# Patient Record
Sex: FEMALE | Race: WHITE | Employment: UNEMPLOYED
[De-identification: names, ages, dates, MRNs, and addresses within clinical notes are randomized per-mention and may not be internally consistent; named-entity substitution may affect disease eponyms.]

---

## 2017-04-19 ENCOUNTER — HOSPITAL ENCOUNTER (OUTPATIENT)
Dept: GENERAL RADIOLOGY | Facility: CLINIC | Age: 47
Discharge: HOME | End: 2017-04-19
Payer: MEDICARE

## 2017-04-19 ENCOUNTER — HOSPITAL ENCOUNTER (OUTPATIENT)
Facility: CLINIC | Age: 47
Discharge: HOME | End: 2017-04-19
Payer: MEDICARE

## 2017-04-19 DIAGNOSIS — Z87.81 HISTORY OF SKULL FRACTURE: ICD-10-CM

## 2017-04-19 DIAGNOSIS — R09.81 NASAL CONGESTION: ICD-10-CM

## 2017-04-19 PROBLEM — F41.9 ANXIETY AND DEPRESSION: Status: ACTIVE | Noted: 2017-04-19

## 2017-04-19 PROBLEM — J30.2 SEASONAL ALLERGIC RHINITIS: Status: ACTIVE | Noted: 2017-04-19

## 2017-04-19 PROBLEM — E11.9 CONTROLLED TYPE 2 DIABETES MELLITUS WITHOUT COMPLICATION, WITHOUT LONG-TERM CURRENT USE OF INSULIN (HCC): Status: ACTIVE | Noted: 2017-04-19

## 2017-04-19 PROBLEM — J45.20 MILD INTERMITTENT ASTHMA WITHOUT COMPLICATION: Status: ACTIVE | Noted: 2017-04-19

## 2017-04-19 PROBLEM — G47.9 SLEEP DISTURBANCE: Status: ACTIVE | Noted: 2017-04-19

## 2017-04-19 PROBLEM — E04.9 GOITER: Status: ACTIVE | Noted: 2017-04-19

## 2017-04-19 PROBLEM — R07.9 CHEST PAIN: Status: ACTIVE | Noted: 2017-04-19

## 2017-04-19 PROBLEM — R00.2 PALPITATIONS: Status: ACTIVE | Noted: 2017-04-19

## 2017-04-19 PROBLEM — R41.3 MEMORY LOSS: Status: ACTIVE | Noted: 2017-04-19

## 2017-04-19 PROBLEM — F32.A ANXIETY AND DEPRESSION: Status: ACTIVE | Noted: 2017-04-19

## 2017-04-19 PROCEDURE — 70260 X-RAY EXAM OF SKULL: CPT

## 2017-04-19 PROCEDURE — 70220 X-RAY EXAM OF SINUSES: CPT

## 2017-04-25 ENCOUNTER — OFFICE VISIT (OUTPATIENT)
Dept: PSYCHOLOGY | Age: 47
End: 2017-04-25
Payer: MEDICARE

## 2017-04-25 DIAGNOSIS — F43.29 STRESS AND ADJUSTMENT REACTION: Primary | ICD-10-CM

## 2017-04-25 PROCEDURE — 90791 PSYCH DIAGNOSTIC EVALUATION: CPT | Performed by: SOCIAL WORKER

## 2017-04-25 ASSESSMENT — PATIENT HEALTH QUESTIONNAIRE - PHQ9
1. LITTLE INTEREST OR PLEASURE IN DOING THINGS: 1
SUM OF ALL RESPONSES TO PHQ QUESTIONS 1-9: 1
SUM OF ALL RESPONSES TO PHQ9 QUESTIONS 1 & 2: 1
2. FEELING DOWN, DEPRESSED OR HOPELESS: 0

## 2017-05-02 ENCOUNTER — OFFICE VISIT (OUTPATIENT)
Dept: PSYCHOLOGY | Age: 47
End: 2017-05-02
Payer: MEDICARE

## 2017-05-02 DIAGNOSIS — F43.29 STRESS AND ADJUSTMENT REACTION: Primary | ICD-10-CM

## 2017-05-02 PROCEDURE — 90832 PSYTX W PT 30 MINUTES: CPT | Performed by: SOCIAL WORKER

## 2017-12-15 ENCOUNTER — HOSPITAL ENCOUNTER (EMERGENCY)
Age: 47
Discharge: HOME | End: 2017-12-15
Attending: EMERGENCY MEDICINE
Payer: MEDICARE

## 2017-12-15 ENCOUNTER — APPOINTMENT (OUTPATIENT)
Dept: GENERAL RADIOLOGY | Age: 47
End: 2017-12-15
Payer: MEDICARE

## 2017-12-15 VITALS
HEIGHT: 68 IN | HEART RATE: 78 BPM | DIASTOLIC BLOOD PRESSURE: 84 MMHG | WEIGHT: 275 LBS | RESPIRATION RATE: 12 BRPM | BODY MASS INDEX: 41.68 KG/M2 | TEMPERATURE: 98.3 F | SYSTOLIC BLOOD PRESSURE: 153 MMHG | OXYGEN SATURATION: 97 %

## 2017-12-15 DIAGNOSIS — M25.562 ACUTE PAIN OF LEFT KNEE: ICD-10-CM

## 2017-12-15 DIAGNOSIS — M79.672 PAIN IN BOTH FEET: ICD-10-CM

## 2017-12-15 DIAGNOSIS — M79.671 PAIN IN BOTH FEET: ICD-10-CM

## 2017-12-15 DIAGNOSIS — M25.572 ACUTE BILATERAL ANKLE PAIN: ICD-10-CM

## 2017-12-15 DIAGNOSIS — R07.81 PAINFUL RIB: ICD-10-CM
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
## 2017-12-15 DIAGNOSIS — L02.91 ABSCESS: ICD-10-CM

## 2017-12-15 DIAGNOSIS — M25.571 ACUTE BILATERAL ANKLE PAIN: ICD-10-CM

## 2017-12-15 PROCEDURE — 73610 X-RAY EXAM OF ANKLE: CPT

## 2017-12-15 PROCEDURE — 99284 EMERGENCY DEPT VISIT MOD MDM: CPT

## 2017-12-15 PROCEDURE — 71111 X-RAY EXAM RIBS/CHEST4/> VWS: CPT

## 2017-12-15 PROCEDURE — 73562 X-RAY EXAM OF KNEE 3: CPT

## 2017-12-15 PROCEDURE — 73630 X-RAY EXAM OF FOOT: CPT

## 2017-12-15 PROCEDURE — 71020 XR CHEST STANDARD TWO VW: CPT

## 2017-12-15 RX ORDER — TRAMADOL HYDROCHLORIDE 50 MG/1
50 TABLET ORAL EVERY 6 HOURS PRN
Qty: 10 TABLET | Refills: 0 | Status: SHIPPED | OUTPATIENT
Start: 2017-12-15 | End: 2018-01-18 | Stop reason: ALTCHOICE

## 2017-12-15 RX ORDER — CLINDAMYCIN HYDROCHLORIDE 150 MG/1
150 CAPSULE ORAL 4 TIMES DAILY
Qty: 28 CAPSULE | Refills: 0 | Status: SHIPPED | OUTPATIENT
Start: 2017-12-15 | End: 2017-12-22

## 2017-12-15 ASSESSMENT — PAIN DESCRIPTION - PAIN TYPE: TYPE: ACUTE PAIN

## 2017-12-15 ASSESSMENT — PAIN SCALES - GENERAL: PAINLEVEL_OUTOF10: 7

## 2017-12-16 NOTE — ED PROVIDER NOTES
[address]  eMERGENCY dEPARTMENT eNCOUnter      Pt Name: Nubia Burnett  MRN: [id number]  [place] 1970  Date of evaluation: 12/15/2017  Provider: Faustina Nice PA-C    CHIEF COMPLAINT       Chief Complaint   Patient presents with    Rib Injury    Toe Pain           HISTORY OF PRESENT ILLNESS  (Location/Symptom, Timing/Onset, Context/Setting, Quality, Duration, Modifying Factors, Severity.)   Nubia Burnett is a 49 y.o. female who presents to the emergency department with complaints of left leg, right foot, and rib pain after falling. Pt states she slipped in her socks and fell down 5-7 stairs 2 days ago. Pt states she fell on all fours. Pt is complaining of left foot, ankle, and knee pain. She also states her right foot and ankle hurts as well as her posterior ribs. Pt denies pain with inspiration. Denies hitting head or loss of consciousness. Denies neck pain, cp, sob, nausea, vomiting, abd pain. Pt is ambulatory. No other complaints. Nursing Notes were reviewed. REVIEW OF SYSTEMS    (2-9 systems for level 4, 10 or more for level 5)     Review of Systems   Musculoskeletal: left leg pain, right foot pain, rib pain, fall     Except as noted above the remainder of the review of systems was reviewed and negative.        PAST MEDICAL HISTORY     Past Medical History:   Diagnosis Date    Asthma     Goiter     Morbid obesity ([address]      None otherwise stated in nurses notes    SURGICAL HISTORY       Past Surgical History:   Procedure Laterality Date    CAUTERIZE INNER NOSE  08/31/2016    FRACTURE SURGERY      small toe on right foot  and left baby finger    SINUS SURGERY  08/31/2016    TONSILLECTOMY       None otherwise stated in nurses notes    [address]       Discharge Medication List as of 12/15/2017  4:46 PM      CONTINUE these medications which have NOT CHANGED    Details   glucose monitoring kit (FREESTYLE) monitoring kit Disp-1 kit, R-0, Benitez BS's Xr Foot Right (min 3 Views)    Result Date: 12/15/2017  EXAMINATION: 3 VIEWS OF THE RIGHT FOOT; 3 VIEWS OF THE RIGHT ANKLE 12/15/2017 3:14 pm COMPARISON: None. HISTORY: ORDERING SYSTEM PROVIDED HISTORY: pain TECHNOLOGIST PROVIDED HISTORY: Reason for exam:->pain Ordering Physician Provided Reason for Exam: PT CO pain in her ribs, both ankles and feet, and her left knee after falling down a flight of stairs 2 days ago. Acuity: Acute Type of Exam: Initial 39-year-old female who complains of acute rib pain, bilateral ankle and foot pain and left knee pain after falling down a flight of stairs 2 days ago FINDINGS: Right ankle: Mild soft tissue swelling overlying the lateral malleolus. Ankle mortise appears intact. Osseous alignment is normal.  Joint spaces are well maintained. No acute fracture or gross dislocation. No marginal erosions. No tibiotalar joint effusion. Boehler's angle is maintained. Moderate plantar calcaneal spur. Mild enthesopathy at the distal Achilles tendon insertion. Right foot: Moderate plantar calcaneal spur. Boehler's angle is maintained. No tibiotalar joint effusion. Mild soft tissue swelling overlying the lateral aspect of the ankle. Osseous alignment is normal.  Joint space is relatively well maintained. No marginal erosions. No acute fracture or gross dislocation. Cortical thickening of the diaphysis of the 4th metatarsal may represent sequela of a age-indeterminate stress fracture or stress reaction. Right ankle: 1. Mild soft tissue swelling overlying the lateral malleolus. 2. Moderate plantar calcaneal spur. 3. No acute fracture or gross dislocation. Right foot: 1. No acute fracture or gross dislocation. 2. Moderate plantar calcaneal spur. 3. Mild soft tissue swelling overlying the lateral aspect of the ankle. 4. Cortical thickening of the 4th metatarsal diaphysis may represent sequela of age-indeterminate stress fracture/stress reaction.   Please correlate for any pain in the region of the 4th metatarsal diaphysis. LABS:  Labs Reviewed - No data to display    All other labs were within normal range or not returned as of this dictation. EMERGENCY DEPARTMENT COURSE and DIFFERENTIAL DIAGNOSIS/MDM:   Vitals:    Vitals:    12/15/17 1434   BP: (!) 153/84   Pulse: 78   Resp: 12   Temp: 98.3 °F (36.8 °C)   TempSrc: Oral   SpO2: 97%   Weight: 275 lb (124.7 kg)   Height: 5' 8\" (1.727 m)       Pt presents with pain after falling down stairs two days ago. Left ankle, foot, knee. Right ankle and foot. Rib pain. Did not hit head. No neuro deficits. Xrays show possible stress fracture over 4th metatarsal.  Did re-examine patient and she is not tender in this area. Does not want splint or crutches. Pt is ok for discharge home. Follow up with PCP. Patient instructed to return to the emergency room if symptoms worsen, return, or any other concern right away which is agreed by the patient    ED MEDS:  Orders Placed This Encounter   Medications    traMADol (ULTRAM) 50 MG tablet     Sig: Take 1 tablet by mouth every 6 hours as needed for Pain . Dispense:  10 tablet     Refill:  0    clindamycin (CLEOCIN) 150 MG capsule     Sig: Take 1 capsule by mouth 4 times daily for 7 days     Dispense:  28 capsule     Refill:  0         CONSULTS:  None    PROCEDURES:  None      FINAL IMPRESSION      1. Fall (on) (from) other stairs and steps, initial encounter    2. Pain in both feet    3. Acute bilateral ankle pain    4. Painful rib    5. Acute pain of left knee    6.  Abscess          DISPOSITION/PLAN   DISPOSITION Decision To Discharge    PATIENT REFERRED TO:  YESSY Calvin [address] [facility],[address]  [address] [facility] [address]  [phone]    Call in 1 day      [facility] ED  [place] [address]  [phone]    If symptoms worsen    M[facility] Goyal [address] [place] [address]  [phone]    Call in 1 day        DISCHARGE MEDICATIONS:  Discharge Medication List as of 12/15/2017  4:46 PM      START taking these medications    Details   traMADol (ULTRAM) 50 MG tablet Take 1 tablet by mouth every 6 hours as needed for Pain ., Disp-10 tablet, R-0Print      clindamycin (CLEOCIN) 150 MG capsule Take 1 capsule by mouth 4 times daily for 7 days, Disp-28 capsule, R-0Print                   (Please note that portions of this note were completed with a voice recognition program.  Efforts were made to edit the dictations but occasionally words are mis-transcribed.)    Srikanth Neives, [address] Jona Alvarez PA-C  12/16/17 9325

## 2018-01-10 ENCOUNTER — HOSPITAL ENCOUNTER (OUTPATIENT)
Age: 48
Setting detail: SPECIMEN
Discharge: HOME | End: 2018-01-10
Payer: MEDICARE

## 2018-01-10 DIAGNOSIS — Z11.4 SCREENING FOR HIV (HUMAN IMMUNODEFICIENCY VIRUS): ICD-10-CM

## 2018-01-10 DIAGNOSIS — R41.3 MEMORY LOSS: ICD-10-CM

## 2018-01-10 DIAGNOSIS — E04.9 GOITER: ICD-10-CM

## 2018-01-10 DIAGNOSIS — E11.9 CONTROLLED TYPE 2 DIABETES MELLITUS WITHOUT COMPLICATION, WITHOUT LONG-TERM CURRENT USE OF INSULIN (HCC): ICD-10-CM

## 2018-01-10 DIAGNOSIS — I10 ESSENTIAL HYPERTENSION: ICD-10-CM

## 2018-01-10 DIAGNOSIS — E01.0 THYROMEGALY: ICD-10-CM

## 2018-01-10 DIAGNOSIS — R00.2 PALPITATIONS: ICD-10-CM

## 2018-01-10 DIAGNOSIS — R07.9 CHEST PAIN, UNSPECIFIED TYPE: ICD-10-CM

## 2018-01-10 DIAGNOSIS — Z00.00 PREVENTATIVE HEALTH CARE: ICD-10-CM

## 2018-01-10 PROBLEM — E55.9 VITAMIN D DEFICIENCY: Status: ACTIVE | Noted: 2018-01-10

## 2018-01-10 PROBLEM — R03.0 ELEVATED BLOOD PRESSURE READING: Status: ACTIVE | Noted: 2018-01-10

## 2018-01-10 LAB
CREATININE URINE: 234.5 MG/DL (ref 28–217)
HCT VFR BLD CALC: 44.7 % (ref 36.3–47.1)
HEMOGLOBIN: 14.5 G/DL (ref 11.9–15.1)
HIV AG/AB: NONREACTIVE
MCH RBC QN AUTO: 28.3 PG (ref 25.2–33.5)
MCHC RBC AUTO-ENTMCNC: 32.4 G/DL (ref 28.4–34.8)
MCV RBC AUTO: 87.3 FL (ref 82.6–102.9)
MICROALBUMIN/CREAT 24H UR: 706 MG/L
MICROALBUMIN/CREAT UR-RTO: 301 MCG/MG CREAT
PDW BLD-RTO: 12.9 % (ref 11.8–14.4)
PLATELET # BLD: 329 K/UL (ref 138–453)
PMV BLD AUTO: 10.7 FL (ref 8.1–13.5)
RBC # BLD: 5.12 M/UL (ref 3.95–5.11)
TSH SERPL DL<=0.05 MIU/L-ACNC: 0.87 MIU/L (ref 0.3–5)
VITAMIN B-12: 425 PG/ML (ref 232–1245)
VITAMIN D 25-HYDROXY: 10.1 NG/ML (ref 30–100)
WBC # BLD: 10.5 K/UL (ref 3.5–11.3)

## 2018-01-11 PROBLEM — R80.9 MICROALBUMINURIA DUE TO TYPE 2 DIABETES MELLITUS (HCC): Status: ACTIVE | Noted: 2018-01-11

## 2018-01-11 PROBLEM — E11.29 MICROALBUMINURIA DUE TO TYPE 2 DIABETES MELLITUS (HCC): Status: ACTIVE | Noted: 2018-01-11

## 2018-01-12 LAB
ALBUMIN SERPL-MCNC: 4.2 G/DL (ref 3.5–5.2)
ALBUMIN/GLOBULIN RATIO: 1.4 (ref 1–2.5)
ALP BLD-CCNC: 81 U/L (ref 35–104)
ALT SERPL-CCNC: 21 U/L (ref 5–33)
ANION GAP SERPL CALCULATED.3IONS-SCNC: 18 MMOL/L (ref 9–17)
AST SERPL-CCNC: 16 U/L
BILIRUB SERPL-MCNC: 0.22 MG/DL (ref 0.3–1.2)
BUN BLDV-MCNC: 6 MG/DL (ref 6–20)
BUN/CREAT BLD: ABNORMAL (ref 9–20)
CALCIUM SERPL-MCNC: 8.8 MG/DL (ref 8.6–10.4)
CHLORIDE BLD-SCNC: 99 MMOL/L (ref 98–107)
CO2: 19 MMOL/L (ref 20–31)
CREAT SERPL-MCNC: 0.51 MG/DL (ref 0.5–0.9)
CULTURE: ABNORMAL
CULTURE: ABNORMAL
GFR AFRICAN AMERICAN: >60 ML/MIN
GFR NON-AFRICAN AMERICAN: >60 ML/MIN
GFR SERPL CREATININE-BSD FRML MDRD: ABNORMAL ML/MIN/{1.73_M2}
GFR SERPL CREATININE-BSD FRML MDRD: ABNORMAL ML/MIN/{1.73_M2}
GLUCOSE BLD-MCNC: 150 MG/DL (ref 70–99)
Lab: ABNORMAL
ORGANISM: ABNORMAL
POTASSIUM SERPL-SCNC: 4.5 MMOL/L (ref 3.7–5.3)
SODIUM BLD-SCNC: 136 MMOL/L (ref 135–144)
SPECIMEN DESCRIPTION: ABNORMAL
STATUS: ABNORMAL
TOTAL PROTEIN: 7.1 G/DL (ref 6.4–8.3)

## 2018-01-17 ENCOUNTER — HOSPITAL ENCOUNTER (OUTPATIENT)
Age: 48
Discharge: HOME | End: 2018-01-17
Payer: MEDICARE

## 2018-01-17 DIAGNOSIS — R07.9 CHEST PAIN, UNSPECIFIED TYPE: ICD-10-CM

## 2018-01-17 DIAGNOSIS — Z00.00 PREVENTATIVE HEALTH CARE: ICD-10-CM

## 2018-01-17 DIAGNOSIS — R00.2 PALPITATIONS: ICD-10-CM

## 2018-01-17 DIAGNOSIS — I10 ESSENTIAL HYPERTENSION: ICD-10-CM

## 2018-01-17 DIAGNOSIS — E11.9 CONTROLLED TYPE 2 DIABETES MELLITUS WITHOUT COMPLICATION, WITHOUT LONG-TERM CURRENT USE OF INSULIN (HCC): ICD-10-CM

## 2018-01-17 DIAGNOSIS — E78.2 HYPERLIPIDEMIA, MIXED: ICD-10-CM

## 2018-01-17 LAB
ALBUMIN SERPL-MCNC: 4.1 G/DL (ref 3.5–5.2)
ALBUMIN/GLOBULIN RATIO: ABNORMAL (ref 1–2.5)
ALP BLD-CCNC: 82 U/L (ref 35–104)
ALT SERPL-CCNC: 25 U/L (ref 5–33)
ANION GAP SERPL CALCULATED.3IONS-SCNC: 15 MMOL/L (ref 9–17)
AST SERPL-CCNC: 18 U/L
BILIRUB SERPL-MCNC: 0.42 MG/DL (ref 0.3–1.2)
BUN BLDV-MCNC: 13 MG/DL (ref 6–20)
BUN/CREAT BLD: ABNORMAL (ref 9–20)
CALCIUM SERPL-MCNC: 9.2 MG/DL (ref 8.6–10.4)
CHLORIDE BLD-SCNC: 101 MMOL/L (ref 98–107)
CHOLESTEROL/HDL RATIO: 5.1
CHOLESTEROL: 192 MG/DL
CO2: 23 MMOL/L (ref 20–31)
CREAT SERPL-MCNC: 0.58 MG/DL (ref 0.5–0.9)
GFR AFRICAN AMERICAN: >60 ML/MIN
GFR NON-AFRICAN AMERICAN: >60 ML/MIN
GFR SERPL CREATININE-BSD FRML MDRD: ABNORMAL ML/MIN/{1.73_M2}
GFR SERPL CREATININE-BSD FRML MDRD: ABNORMAL ML/MIN/{1.73_M2}
GLUCOSE BLD-MCNC: 149 MG/DL (ref 70–99)
HDLC SERPL-MCNC: 38 MG/DL
LDL CHOLESTEROL: 120 MG/DL (ref 0–130)
POTASSIUM SERPL-SCNC: 4.4 MMOL/L (ref 3.7–5.3)
SODIUM BLD-SCNC: 139 MMOL/L (ref 135–144)
TOTAL PROTEIN: 7.4 G/DL (ref 6.4–8.3)
TRIGL SERPL-MCNC: 169 MG/DL
VLDLC SERPL CALC-MCNC: ABNORMAL MG/DL (ref 1–30)

## 2018-01-17 PROCEDURE — 80061 LIPID PANEL: CPT

## 2018-01-17 PROCEDURE — 36415 COLL VENOUS BLD VENIPUNCTURE: CPT

## 2018-01-17 PROCEDURE — 80053 COMPREHEN METABOLIC PANEL: CPT

## 2018-01-18 ENCOUNTER — HOSPITAL ENCOUNTER (OUTPATIENT)
Age: 48
Setting detail: SPECIMEN
Discharge: HOME | End: 2018-01-18
Payer: MEDICARE

## 2018-01-18 DIAGNOSIS — R30.0 DYSURIA: ICD-10-CM

## 2018-01-18 DIAGNOSIS — Z87.440 HISTORY OF UTI: ICD-10-CM

## 2018-01-18 PROBLEM — M79.671 CHRONIC PAIN OF RIGHT HEEL: Status: ACTIVE | Noted: 2018-01-18

## 2018-01-18 PROBLEM — R03.0 ELEVATED BLOOD PRESSURE READING: Status: RESOLVED | Noted: 2018-01-10 | Resolved: 2018-01-18

## 2018-01-18 PROBLEM — G89.29 CHRONIC PAIN OF RIGHT HEEL: Status: ACTIVE | Noted: 2018-01-18

## 2018-01-19 LAB
CULTURE: NORMAL
CULTURE: NORMAL
Lab: NORMAL
SPECIMEN DESCRIPTION: NORMAL
STATUS: NORMAL

## 2018-01-24 ENCOUNTER — HOSPITAL ENCOUNTER (OUTPATIENT)
Dept: GENERAL RADIOLOGY | Facility: CLINIC | Age: 48
Discharge: HOME | End: 2018-01-24
Payer: MEDICARE

## 2018-01-24 ENCOUNTER — HOSPITAL ENCOUNTER (OUTPATIENT)
Facility: CLINIC | Age: 48
Discharge: HOME | End: 2018-01-24
Payer: MEDICARE

## 2018-01-24 ENCOUNTER — HOSPITAL ENCOUNTER (OUTPATIENT)
Age: 48
Setting detail: SPECIMEN
Discharge: HOME | End: 2018-01-24
Payer: MEDICARE

## 2018-01-24 DIAGNOSIS — N30.01 ACUTE CYSTITIS WITH HEMATURIA: ICD-10-CM

## 2018-01-24 PROCEDURE — 74018 RADEX ABDOMEN 1 VIEW: CPT

## 2018-01-26 LAB
CULTURE: ABNORMAL
CULTURE: ABNORMAL
Lab: ABNORMAL
ORGANISM: ABNORMAL
SPECIMEN DESCRIPTION: ABNORMAL
STATUS: ABNORMAL

## 2018-02-01 ENCOUNTER — HOSPITAL ENCOUNTER (OUTPATIENT)
Age: 48
Setting detail: SPECIMEN
Discharge: HOME | End: 2018-02-01
Payer: MEDICARE

## 2018-02-01 DIAGNOSIS — Z87.440 HISTORY OF UTI: ICD-10-CM

## 2018-02-02 LAB
CULTURE: NORMAL
CULTURE: NORMAL
Lab: NORMAL
SPECIMEN DESCRIPTION: NORMAL
STATUS: NORMAL

## 2018-04-17 ENCOUNTER — HOSPITAL ENCOUNTER (OUTPATIENT)
Age: 48
Setting detail: SPECIMEN
Discharge: HOME | End: 2018-04-17
Payer: MEDICARE

## 2018-04-17 DIAGNOSIS — E55.9 VITAMIN D DEFICIENCY: ICD-10-CM

## 2018-04-17 LAB — VITAMIN D 25-HYDROXY: 13.8 NG/ML (ref 30–100)

## 2018-06-22 ENCOUNTER — HOSPITAL ENCOUNTER (OUTPATIENT)
Dept: ULTRASOUND IMAGING | Age: 48
Discharge: HOME | End: 2018-06-24
Payer: MEDICARE

## 2018-06-22 ENCOUNTER — OFFICE VISIT (OUTPATIENT)
Dept: FAMILY MEDICINE CLINIC | Age: 48
End: 2018-06-22
Payer: MEDICARE

## 2018-06-22 ENCOUNTER — HOSPITAL ENCOUNTER (OUTPATIENT)
Age: 48
Discharge: HOME | End: 2018-06-24
Payer: MEDICARE

## 2018-06-22 ENCOUNTER — HOSPITAL ENCOUNTER (OUTPATIENT)
Dept: GENERAL RADIOLOGY | Age: 48
Discharge: HOME | End: 2018-06-24
Payer: MEDICARE

## 2018-06-22 ENCOUNTER — HOSPITAL ENCOUNTER (OUTPATIENT)
Dept: CT IMAGING | Age: 48
Discharge: HOME | End: 2018-06-24
Payer: MEDICARE

## 2018-06-22 ENCOUNTER — HOSPITAL ENCOUNTER (OUTPATIENT)
Age: 48
Discharge: HOME | End: 2018-06-22
Payer: MEDICARE

## 2018-06-22 VITALS
SYSTOLIC BLOOD PRESSURE: 110 MMHG | HEART RATE: 80 BPM | TEMPERATURE: 97.9 F | RESPIRATION RATE: 18 BRPM | WEIGHT: 246 LBS | DIASTOLIC BLOOD PRESSURE: 70 MMHG | BODY MASS INDEX: 37.41 KG/M2

## 2018-06-22 DIAGNOSIS — E11.29 MICROALBUMINURIA DUE TO TYPE 2 DIABETES MELLITUS (HCC): ICD-10-CM

## 2018-06-22 DIAGNOSIS — E78.2 HYPERLIPIDEMIA, MIXED: ICD-10-CM

## 2018-06-22 DIAGNOSIS — M54.9 MID BACK PAIN: ICD-10-CM

## 2018-06-22 DIAGNOSIS — R20.2 NUMBNESS AND TINGLING OF LEFT LEG: ICD-10-CM

## 2018-06-22 DIAGNOSIS — R80.9 MICROALBUMINURIA DUE TO TYPE 2 DIABETES MELLITUS (HCC): ICD-10-CM

## 2018-06-22 DIAGNOSIS — G89.29 CHRONIC BILATERAL LOW BACK PAIN WITHOUT SCIATICA: ICD-10-CM

## 2018-06-22 DIAGNOSIS — M54.50 CHRONIC BILATERAL LOW BACK PAIN WITHOUT SCIATICA: ICD-10-CM

## 2018-06-22 DIAGNOSIS — R20.0 NUMBNESS AND TINGLING OF LEFT LEG: ICD-10-CM

## 2018-06-22 DIAGNOSIS — R41.3 SHORT-TERM MEMORY LOSS: ICD-10-CM

## 2018-06-22 DIAGNOSIS — E11.9 CONTROLLED TYPE 2 DIABETES MELLITUS WITHOUT COMPLICATION, WITHOUT LONG-TERM CURRENT USE OF INSULIN (HCC): ICD-10-CM

## 2018-06-22 DIAGNOSIS — R20.0 NUMBNESS AND TINGLING OF LEFT LEG: Primary | ICD-10-CM

## 2018-06-22 DIAGNOSIS — J01.30 ACUTE SPHENOIDAL SINUSITIS, RECURRENCE NOT SPECIFIED: Primary | ICD-10-CM

## 2018-06-22 DIAGNOSIS — E55.9 VITAMIN D DEFICIENCY: ICD-10-CM

## 2018-06-22 DIAGNOSIS — R20.2 NUMBNESS AND TINGLING OF LEFT LEG: Primary | ICD-10-CM

## 2018-06-22 DIAGNOSIS — I10 ESSENTIAL HYPERTENSION: ICD-10-CM

## 2018-06-22 PROBLEM — M51.36 LUMBAR DEGENERATIVE DISC DISEASE: Status: ACTIVE | Noted: 2018-06-22

## 2018-06-22 PROBLEM — M51.369 LUMBAR DEGENERATIVE DISC DISEASE: Status: ACTIVE | Noted: 2018-06-22

## 2018-06-22 LAB
TSH SERPL DL<=0.05 MIU/L-ACNC: 0.77 MIU/L (ref 0.3–5)
VITAMIN B-12: 386 PG/ML (ref 232–1245)
VITAMIN D 25-HYDROXY: 14.3 NG/ML (ref 30–100)

## 2018-06-22 PROCEDURE — 1036F TOBACCO NON-USER: CPT | Performed by: NURSE PRACTITIONER

## 2018-06-22 PROCEDURE — 70450 CT HEAD/BRAIN W/O DYE: CPT

## 2018-06-22 PROCEDURE — G8427 DOCREV CUR MEDS BY ELIG CLIN: HCPCS | Performed by: NURSE PRACTITIONER

## 2018-06-22 PROCEDURE — 82607 VITAMIN B-12: CPT

## 2018-06-22 PROCEDURE — 93971 EXTREMITY STUDY: CPT

## 2018-06-22 PROCEDURE — 72070 X-RAY EXAM THORAC SPINE 2VWS: CPT

## 2018-06-22 PROCEDURE — 99214 OFFICE O/P EST MOD 30 MIN: CPT | Performed by: NURSE PRACTITIONER

## 2018-06-22 PROCEDURE — 84443 ASSAY THYROID STIM HORMONE: CPT

## 2018-06-22 PROCEDURE — G8417 CALC BMI ABV UP PARAM F/U: HCPCS | Performed by: NURSE PRACTITIONER

## 2018-06-22 PROCEDURE — 36415 COLL VENOUS BLD VENIPUNCTURE: CPT

## 2018-06-22 PROCEDURE — 72100 X-RAY EXAM L-S SPINE 2/3 VWS: CPT

## 2018-06-22 PROCEDURE — 82306 VITAMIN D 25 HYDROXY: CPT

## 2018-06-22 RX ORDER — LISINOPRIL 2.5 MG/1
2.5 TABLET ORAL DAILY
Qty: 30 TABLET | Refills: 3 | Status: SHIPPED | OUTPATIENT
Start: 2018-06-22 | End: 2018-09-20 | Stop reason: SDUPTHER

## 2018-06-22 RX ORDER — AMOXICILLIN AND CLAVULANATE POTASSIUM 875; 125 MG/1; MG/1
1 TABLET, FILM COATED ORAL 2 TIMES DAILY WITH MEALS
Qty: 14 TABLET | Refills: 0 | Status: SHIPPED | OUTPATIENT
Start: 2018-06-22 | End: 2019-01-25 | Stop reason: SDUPTHER

## 2018-06-22 RX ORDER — ATORVASTATIN CALCIUM 10 MG/1
10 TABLET, FILM COATED ORAL NIGHTLY
Qty: 30 TABLET | Refills: 3 | Status: SHIPPED | OUTPATIENT
Start: 2018-06-22 | End: 2018-09-20 | Stop reason: SDUPTHER

## 2018-06-22 RX ORDER — PREDNISONE 10 MG/1
TABLET ORAL
Qty: 18 TABLET | Refills: 0 | Status: SHIPPED | OUTPATIENT
Start: 2018-06-22 | End: 2018-07-01

## 2018-06-22 RX ORDER — METFORMIN HYDROCHLORIDE 750 MG/1
750 TABLET, EXTENDED RELEASE ORAL 2 TIMES DAILY
Qty: 60 TABLET | Refills: 3 | Status: SHIPPED | OUTPATIENT
Start: 2018-06-22 | End: 2018-09-20 | Stop reason: SDUPTHER

## 2018-06-22 RX ORDER — ISOPROPYL ALCOHOL 0.75 G/1
SWAB TOPICAL
Qty: 100 EACH | Refills: 3 | Status: SHIPPED | OUTPATIENT
Start: 2018-06-22 | End: 2018-11-27 | Stop reason: SDUPTHER

## 2018-06-22 ASSESSMENT — PATIENT HEALTH QUESTIONNAIRE - PHQ9
1. LITTLE INTEREST OR PLEASURE IN DOING THINGS: 0
SUM OF ALL RESPONSES TO PHQ9 QUESTIONS 1 & 2: 0
SUM OF ALL RESPONSES TO PHQ QUESTIONS 1-9: 0
2. FEELING DOWN, DEPRESSED OR HOPELESS: 0

## 2018-06-22 ASSESSMENT — ENCOUNTER SYMPTOMS: BACK PAIN: 1

## 2018-07-03 DIAGNOSIS — J45.20 MILD INTERMITTENT ASTHMA WITHOUT COMPLICATION: ICD-10-CM

## 2018-07-03 RX ORDER — NEBULIZER ACCESSORIES
EACH MISCELLANEOUS
Qty: 2 EACH | Refills: 5 | Status: SHIPPED | OUTPATIENT
Start: 2018-07-03 | End: 2019-02-28 | Stop reason: SDUPTHER

## 2018-07-20 ENCOUNTER — HOSPITAL ENCOUNTER (OUTPATIENT)
Age: 48
Setting detail: SPECIMEN
Discharge: HOME | End: 2018-07-20
Payer: MEDICARE

## 2018-07-20 ENCOUNTER — HOSPITAL ENCOUNTER (OUTPATIENT)
Facility: CLINIC | Age: 48
Discharge: HOME | End: 2018-07-22
Payer: MEDICARE

## 2018-07-20 ENCOUNTER — HOSPITAL ENCOUNTER (OUTPATIENT)
Dept: GENERAL RADIOLOGY | Facility: CLINIC | Age: 48
Discharge: HOME | End: 2018-07-22
Payer: MEDICARE

## 2018-07-20 ENCOUNTER — OFFICE VISIT (OUTPATIENT)
Dept: FAMILY MEDICINE CLINIC | Age: 48
End: 2018-07-20
Payer: MEDICARE

## 2018-07-20 VITALS
HEART RATE: 64 BPM | TEMPERATURE: 98 F | RESPIRATION RATE: 16 BRPM | SYSTOLIC BLOOD PRESSURE: 120 MMHG | DIASTOLIC BLOOD PRESSURE: 70 MMHG | BODY MASS INDEX: 36.65 KG/M2 | OXYGEN SATURATION: 98 % | WEIGHT: 241 LBS

## 2018-07-20 DIAGNOSIS — Z00.00 PREVENTATIVE HEALTH CARE: ICD-10-CM

## 2018-07-20 DIAGNOSIS — R43.8 HYPOSMIA: ICD-10-CM

## 2018-07-20 DIAGNOSIS — R80.9 CONTROLLED TYPE 2 DIABETES MELLITUS WITH MICROALBUMINURIA, WITHOUT LONG-TERM CURRENT USE OF INSULIN (HCC): Primary | ICD-10-CM

## 2018-07-20 DIAGNOSIS — R80.9 CONTROLLED TYPE 2 DIABETES MELLITUS WITH MICROALBUMINURIA, WITHOUT LONG-TERM CURRENT USE OF INSULIN (HCC): ICD-10-CM

## 2018-07-20 DIAGNOSIS — R20.2 NUMBNESS AND TINGLING OF LEFT LEG: ICD-10-CM

## 2018-07-20 DIAGNOSIS — J32.9 CHRONIC SINUSITIS, UNSPECIFIED LOCATION: ICD-10-CM

## 2018-07-20 DIAGNOSIS — R20.0 NUMBNESS AND TINGLING OF LEFT LEG: ICD-10-CM

## 2018-07-20 DIAGNOSIS — Z12.39 SCREENING FOR BREAST CANCER: ICD-10-CM

## 2018-07-20 DIAGNOSIS — E11.29 CONTROLLED TYPE 2 DIABETES MELLITUS WITH MICROALBUMINURIA, WITHOUT LONG-TERM CURRENT USE OF INSULIN (HCC): ICD-10-CM

## 2018-07-20 DIAGNOSIS — E55.9 VITAMIN D DEFICIENCY: ICD-10-CM

## 2018-07-20 DIAGNOSIS — I10 ESSENTIAL HYPERTENSION: ICD-10-CM

## 2018-07-20 DIAGNOSIS — E01.0 THYROMEGALY: ICD-10-CM

## 2018-07-20 DIAGNOSIS — J45.20 MILD INTERMITTENT ASTHMA WITHOUT COMPLICATION: ICD-10-CM

## 2018-07-20 DIAGNOSIS — E11.29 CONTROLLED TYPE 2 DIABETES MELLITUS WITH MICROALBUMINURIA, WITHOUT LONG-TERM CURRENT USE OF INSULIN (HCC): Primary | ICD-10-CM

## 2018-07-20 DIAGNOSIS — E78.2 HYPERLIPIDEMIA, MIXED: ICD-10-CM

## 2018-07-20 LAB
CREATININE URINE: 281.5 MG/DL (ref 28–217)
HBA1C MFR BLD: 6.3 %
MICROALBUMIN/CREAT 24H UR: 14 MG/L
MICROALBUMIN/CREAT UR-RTO: 5 MCG/MG CREAT

## 2018-07-20 PROCEDURE — 1036F TOBACCO NON-USER: CPT | Performed by: NURSE PRACTITIONER

## 2018-07-20 PROCEDURE — 2022F DILAT RTA XM EVC RTNOPTHY: CPT | Performed by: NURSE PRACTITIONER

## 2018-07-20 PROCEDURE — 3044F HG A1C LEVEL LT 7.0%: CPT | Performed by: NURSE PRACTITIONER

## 2018-07-20 PROCEDURE — G8417 CALC BMI ABV UP PARAM F/U: HCPCS | Performed by: NURSE PRACTITIONER

## 2018-07-20 PROCEDURE — 73590 X-RAY EXAM OF LOWER LEG: CPT

## 2018-07-20 PROCEDURE — 83036 HEMOGLOBIN GLYCOSYLATED A1C: CPT | Performed by: NURSE PRACTITIONER

## 2018-07-20 PROCEDURE — 99214 OFFICE O/P EST MOD 30 MIN: CPT | Performed by: NURSE PRACTITIONER

## 2018-07-20 PROCEDURE — G8427 DOCREV CUR MEDS BY ELIG CLIN: HCPCS | Performed by: NURSE PRACTITIONER

## 2018-07-20 RX ORDER — AMOXICILLIN AND CLAVULANATE POTASSIUM 875; 125 MG/1; MG/1
1 TABLET, FILM COATED ORAL 2 TIMES DAILY
COMMUNITY
Start: 2018-07-16 | End: 2018-10-08 | Stop reason: ALTCHOICE

## 2018-07-20 ASSESSMENT — ENCOUNTER SYMPTOMS
SHORTNESS OF BREATH: 1
WHEEZING: 0

## 2018-07-20 NOTE — PROGRESS NOTES
Visit Information    Have you changed or started any medications since your last visit including any over-the-counter medicines, vitamins, or herbal medicines? no   Have you stopped taking any of your medications? Is so, why? -  no  Are you having any side effects from any of your medications? - no    Have you seen any other physician or provider since your last visit? yes - ENT   Have you had any other diagnostic tests since your last visit?  no   Have you been seen in the emergency room and/or had an admission in a hospital since we last saw you?  no   Have you had your routine dental cleaning in the past 6 months?  yes -      Do you have an active MyChart account? If no, what is the barrier?   No:     Patient Care Team:  KAYLA Cardenas CNP as PCP - General (Nurse Practitioner Family)  KAYLA Cardenas CNP as PCP - S Attributed Provider  Breanna Covington MD (Endocrinology, Diabetes, & Metabolism)    Medical History Review  Past Medical, Family, and Social History reviewed and does not contribute to the patient presenting condition    Health Maintenance   Topic Date Due    Diabetic retinal exam  05/09/2018    Cervical cancer screen  06/15/2018    DTaP/Tdap/Td vaccine (1 - Tdap) 01/18/2019 (Originally [birth year]/10/1989)    Flu vaccine (1) 09/01/2018    Lipid screen  01/17/2019    Potassium monitoring  01/17/2019    Creatinine monitoring  01/17/2019    Diabetic foot exam  01/18/2019    A1C test (Diabetic or Prediabetic)  04/17/2019    Pneumococcal med risk  Completed    HIV screen  Completed
spray 1-2 sprays by Nasal route daily as needed for Rhinitis or Allergies 1 Bottle 3    Spacer/Aero-Holding Chambers (E-Z SPACER) KAREN 1 Device by Does not apply route daily as needed 1 Device 0     No current facility-administered medications for this visit. Allergies   Allergen Reactions    Codeine Other (See Comments)     hallucinations       Health Maintenance   Topic Date Due    Diabetic retinal exam  05/09/2018    Cervical cancer screen  06/15/2018    DTaP/Tdap/Td vaccine (1 - Tdap) 01/18/2019 (Originally 6/10/1988)    Flu vaccine (1) 09/01/2018    Lipid screen  01/17/2019    Potassium monitoring  01/17/2019    Creatinine monitoring  01/17/2019    Diabetic foot exam  01/18/2019    A1C test (Diabetic or Prediabetic)  04/17/2019    Pneumococcal med risk  Completed    HIV screen  Completed       Subjective:      Review of Systems   Constitutional: Negative for chills and fever. HENT: Negative. Respiratory: Positive for shortness of breath (Mild- hx of asthma). Negative for wheezing. Cardiovascular: Negative. Neurological: Positive for numbness. Objective:     /70   Pulse 64   Temp 98 °F (36.7 °C)   Resp 16   Wt 241 lb (109.3 kg)   SpO2 98%   BMI 36.65 kg/m²   Physical Exam   Constitutional: She is oriented to person, place, and time. She appears well-developed and well-nourished. Non-toxic appearance. She does not have a sickly appearance. HENT:   Head: Normocephalic and atraumatic. Right Ear: External ear normal.   Left Ear: External ear normal.   Eyes: Conjunctivae are normal.   Neck: Normal range of motion. Carotid bruit is not present. Thyromegaly (Goiter deviated to right side) present. Cardiovascular: Normal rate, regular rhythm, normal heart sounds and intact distal pulses. Exam reveals no gallop and no friction rub. No murmur heard. Pulmonary/Chest: Effort normal and breath sounds normal. No accessory muscle usage or stridor.  No respiratory

## 2018-07-24 ENCOUNTER — HOSPITAL ENCOUNTER (OUTPATIENT)
Dept: WOMENS IMAGING | Age: 48
Discharge: HOME | End: 2018-07-26
Payer: MEDICARE

## 2018-07-24 ENCOUNTER — OFFICE VISIT (OUTPATIENT)
Dept: NEUROLOGY | Age: 48
End: 2018-07-24
Payer: MEDICARE

## 2018-07-24 ENCOUNTER — HOSPITAL ENCOUNTER (OUTPATIENT)
Dept: ULTRASOUND IMAGING | Age: 48
Discharge: HOME | End: 2018-07-26
Payer: MEDICARE

## 2018-07-24 VITALS
SYSTOLIC BLOOD PRESSURE: 173 MMHG | DIASTOLIC BLOOD PRESSURE: 103 MMHG | HEIGHT: 68 IN | HEART RATE: 72 BPM | BODY MASS INDEX: 36.83 KG/M2 | WEIGHT: 243 LBS

## 2018-07-24 DIAGNOSIS — Z12.39 SCREENING FOR BREAST CANCER: ICD-10-CM

## 2018-07-24 DIAGNOSIS — F98.8 ATTENTION DEFICIT DISORDER, UNSPECIFIED HYPERACTIVITY PRESENCE: ICD-10-CM

## 2018-07-24 DIAGNOSIS — R41.3 MEMORY LOSS: ICD-10-CM

## 2018-07-24 DIAGNOSIS — E01.0 THYROMEGALY: ICD-10-CM

## 2018-07-24 DIAGNOSIS — R20.2 PARESTHESIA: Primary | ICD-10-CM

## 2018-07-24 PROBLEM — E04.1 RIGHT THYROID NODULE: Status: ACTIVE | Noted: 2018-07-24

## 2018-07-24 PROBLEM — R93.89 THYROID WITH HETEROGENEOUS ECHOTEXTURE DETERMINED BY ULTRASOUND: Status: ACTIVE | Noted: 2018-07-24

## 2018-07-24 PROBLEM — E04.1 LEFT THYROID NODULE: Status: ACTIVE | Noted: 2018-07-24

## 2018-07-24 PROBLEM — E04.1 NODULAR THYROID DISEASE: Status: ACTIVE | Noted: 2018-07-24

## 2018-07-24 PROCEDURE — 76536 US EXAM OF HEAD AND NECK: CPT

## 2018-07-24 PROCEDURE — 77063 BREAST TOMOSYNTHESIS BI: CPT

## 2018-07-24 PROCEDURE — G8427 DOCREV CUR MEDS BY ELIG CLIN: HCPCS | Performed by: PSYCHIATRY & NEUROLOGY

## 2018-07-24 PROCEDURE — G8417 CALC BMI ABV UP PARAM F/U: HCPCS | Performed by: PSYCHIATRY & NEUROLOGY

## 2018-07-24 PROCEDURE — 99244 OFF/OP CNSLTJ NEW/EST MOD 40: CPT | Performed by: PSYCHIATRY & NEUROLOGY

## 2018-07-24 NOTE — PROGRESS NOTES
[facility]  [address]  [place], [address] [facility]  Dept: [phone]  Dept Fax: [phone]       E. Barrie Frieze, MD Loyde Landau, MD Ahmed B. Gordan Spain, MD Bertrum Arenas, MD Donzell Sierras, MD Madilyn Spencer, CNP    New Patient Consultation    7/24/2018    History of Present Illness:  I had the pleasure of seeing your patient, Naomi Gould who presents with numbness and tingling in the L leg and memory problems. Patient reports that approximately a month ago, she had sudden onset of numbness and tingling in the anterior left leg while she was sitting with her legs crossed. She reports sensations of pins and needles doubt is localized addition in the anterior left leg. She denies any associated weakness, no bowel or bladder issues, does report occasional aching pain in the anterior shin as well. She had a DVT ultrasound as well as an x-ray of the left leg which showed no abnormalities. X-ray of the thoracic and lumbar spine showed mild degenerative changes but no other significant findings. A noncontrast head CT done recently was also unremarkable. Patient also reports a one-year history of nonprogressive memory issues. She reports she has trouble with short-term recall and will forget what she has done during the day. She reports she does not remember if she has eating or taking her medications. She denies any mood changes, no hallucinations, no problems with spatial orientation or getting lost. She currently does not drive because she doesn't have a vehicle. She does report being under a lot of personal stress as she has multiple family members with illness and legal issues. She denies any history of mental health problems but feels that she does suffer from depression, she reports seeing a therapist recently but feels that it did not help at all.  She also reports that she gets distracted easily and is trouble paying attention and focusing, denies any amoxicillin-clavulanate (AUGMENTIN) 875-125 MG per tablet Take 1 tablet by mouth 2 times daily      Respiratory Therapy Supplies (NEBULIZER AIR TUBE/PLUGS) MISC Use with nebulizer with treatments for asthma, up to four times daily. 2 each 5    Alcohol Swabs (B-D SINGLE USE SWABS REGULAR) PADS USE AS DIRECTED DAILY 100 each 3    metFORMIN (GLUCOPHAGE-XR) 750 MG extended release tablet Take 1 tablet by mouth 2 times daily 60 tablet 3    atorvastatin (LIPITOR) 10 MG tablet Take 1 tablet by mouth nightly 30 tablet 3    lisinopril (ZESTRIL) 2.5 MG tablet Take 1 tablet by mouth daily 30 tablet 3    ONETOUCH DELICA LANCETS 22B MISC USE AS DIRECTED DAILY 100 each 3    ONETOUCH VERIO strip USE AS DIRECTED DAILY 100 each 3    vitamin D (ERGOCALCIFEROL) 92902 units CAPS capsule Take 1 capsule by mouth once a week for 16 doses 16 capsule 0    albuterol sulfate HFA (VENTOLIN HFA) 108 (90 Base) MCG/ACT inhaler Inhale 1-2 puffs into the lungs every 4 hours as needed for Wheezing or Shortness of Breath (Space out to every 6 hours PRN as s/s improve) 1 Inhaler 3    ipratropium-albuterol (DUONEB) 0.5-2.5 (3) MG/3ML SOLN nebulizer solution Take 3 mLs by nebulization every 4 hours as needed for Shortness of Breath (Wheezing) 360 mL 1    triamcinolone (KENALOG) 0.1 % cream daily as needed       Lancets MISC Check BS's daily. 100 each 3    Alcohol Swabs 70 % PADS Check BS's daily. 100 each 3    cetirizine (ZYRTEC) 10 MG tablet Take 1 tablet by mouth nightly as needed for Allergies or Rhinitis 30 tablet 3    fluticasone (FLONASE) 50 MCG/ACT nasal spray 1-2 sprays by Nasal route daily as needed for Rhinitis or Allergies 1 Bottle 3    Spacer/Aero-Holding Chambers (E-Z SPACER) KAREN 1 Device by Does not apply route daily as needed 1 Device 0     No current facility-administered medications for this visit.         Allergies   Allergen Reactions    Codeine Other (See Comments)     hallucinations            REVIEW OF SYSTEMS    CONSTITUTIONAL Weight: absent, Appetite: absent, Fatigue: absent      HEENT Ears: normal, Visual disturbance: absent   RESPIRATORY Shortness of breath: absent, Cough: absent   CARDIOVASCULAR Chest pain: absent, Leg swelling :absent      GI Constipation: absent, Diarrhea: absent, Swallowing change: absent       Urinary frequency: absent, Urinary urgency: absent, Urinary incontinence: absent   MUSCULOSKELETAL Neck pain: absent, Back pain: absent, Stiffness: absent, Muscle pain: absent, Joint pain: absent Restless legs: absent   DERMATOLOGIC Hair loss: absent, Skin changes: absent   NEUROLOGIC Memory loss: present, Confusion: absent, Seizures: absent Trouble walking or imbalance: absent, Dizziness: absent, Weakness: absent, Numbness: absent Tremor: absent, Spasm: absent, Speech difficulty: absent, Headache: absent, Light sensitivity: absent   PSYCHIATRIC Anxiety: absent, Hallucination: absent, Mood disorder: absent   HEMATOLOGIC Abnormal bleeding: absent, Anemia: absent, Clotting disorder: absent, Lymph gland changes: absent       Vitals:    07/24/18 1056   BP: (!) 173/103   Pulse:      Admission weight: 243 lb (110.2 kg)                                           .                                                                                                     General Appearance:  Alert, cooperative, no signs of distress, appears stated age   Head:  Normocephalic, no signs of trauma   Eyes:  Conjunctiva/corneas clear;  eyelids intact   Ears:  Normal external ear and canals   Nose: Nares normal, mucosa normal, no drainage    Throat: Lips and tongue normal; teeth normal;  gums normal   Neck: Supple, intact flexion, extension and rotation;   trachea midline;  no adenopathy;   thyroid: not enlarged;   no carotid pulse abnormality   Back:   Symmetric, no curvature, ROM adequate   Lungs:   Respirations unlabored   Chest Wall:  No deformity   Heart:  Regular rate and rhythm                 Extremities: Extremities normal, no cyanosis, no edema   Pulses: Symmetric over head and neck   Skin: Skin color, texture normal, no rashes, no lesions               Neurological Examination    Mental status    Alert and oriented x 3; intact memory with no confusion, speech or language problems; no hallucinations or delusions  Fund of information appropriate for level of education    Cranial nerves    II - visual fields intact to confrontation , fundoscopy showed no  papiledema                                                III, IV, VI  extra-ocular muscles full: no pupillary defect; no HILLARY, no nystagmus, no ptosis   V - normal facial sensation                                                               VII - normal facial symmetry                                                             VIII - intact hearing                                                                             IX, X - symmetrical palate                                                                  XI - symmetrical shoulder shrug                                                       XII - tongue midline without atrophy or fasciculation      Motor function  Normal muscle bulk and tone; strength 5/5 on all 4 extremities, no pronator drift      Sensory function Patchy sensory loss to light touch and pinprick in the anterior shin with no dermatomal distribution      Cerebellar Intact fine motor movement. No involuntary movements or tremors. No ataxia or dysmetria on finger to nose or heel to shin testing      Reflex function DTR 2+ on bilateral UE and LE, symmetric. Negative Babinski      Gait                   normal base and arm swing            Assessment:  Memory loss, possibly ADHD  Paresthesias    Plan: This is a 51 y.o. female who presents with a one-month history of numbness and tingling in the anterior left leg as well as a one-year history of memory problems.  Exam today shows very patchy sensory loss with no significant dermatomal

## 2018-07-25 DIAGNOSIS — R41.3 MEMORY LOSS: ICD-10-CM

## 2018-07-25 DIAGNOSIS — F98.8 ATTENTION DEFICIT DISORDER, UNSPECIFIED HYPERACTIVITY PRESENCE: Primary | ICD-10-CM

## 2018-08-22 ENCOUNTER — TELEPHONE (OUTPATIENT)
Dept: FAMILY MEDICINE CLINIC | Age: 48
End: 2018-08-22

## 2018-08-22 DIAGNOSIS — E55.9 VITAMIN D DEFICIENCY: Primary | ICD-10-CM

## 2018-08-22 RX ORDER — ALBUTEROL SULFATE 90 UG/1
1-2 AEROSOL, METERED RESPIRATORY (INHALATION) EVERY 4 HOURS PRN
Qty: 1 INHALER | Refills: 3 | Status: SHIPPED | OUTPATIENT
Start: 2018-08-22 | End: 2018-12-27 | Stop reason: SDUPTHER

## 2018-08-22 RX ORDER — ERGOCALCIFEROL 1.25 MG/1
50000 CAPSULE ORAL WEEKLY
Qty: 16 CAPSULE | Refills: 0 | OUTPATIENT
Start: 2018-08-22 | End: 2018-12-06

## 2018-08-22 NOTE — TELEPHONE ENCOUNTER
Patient states she is still finishing up last weekly dose of high vit d will get level rechecked before next appt in October.

## 2018-08-29 ENCOUNTER — OFFICE VISIT (OUTPATIENT)
Dept: NEUROLOGY | Age: 48
End: 2018-08-29
Payer: MEDICARE

## 2018-08-29 DIAGNOSIS — R20.2 NUMBNESS AND TINGLING OF LOWER EXTREMITY: Primary | ICD-10-CM

## 2018-08-29 DIAGNOSIS — R20.0 NUMBNESS AND TINGLING OF LOWER EXTREMITY: Primary | ICD-10-CM

## 2018-08-29 DIAGNOSIS — R20.2 PARESTHESIA: ICD-10-CM

## 2018-08-29 PROCEDURE — 95886 MUSC TEST DONE W/N TEST COMP: CPT | Performed by: PSYCHIATRY & NEUROLOGY

## 2018-08-29 PROCEDURE — 95909 NRV CNDJ TST 5-6 STUDIES: CPT | Performed by: PSYCHIATRY & NEUROLOGY
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
## 2018-09-20 ENCOUNTER — OFFICE VISIT (OUTPATIENT)
Dept: NEUROLOGY | Age: 48
End: 2018-09-20
Payer: MEDICARE

## 2018-09-20 VITALS
HEIGHT: 68 IN | SYSTOLIC BLOOD PRESSURE: 150 MMHG | DIASTOLIC BLOOD PRESSURE: 82 MMHG | BODY MASS INDEX: 36.86 KG/M2 | HEART RATE: 60 BPM | WEIGHT: 243.2 LBS

## 2018-09-20 DIAGNOSIS — E78.2 HYPERLIPIDEMIA, MIXED: ICD-10-CM

## 2018-09-20 DIAGNOSIS — I10 ESSENTIAL HYPERTENSION: ICD-10-CM

## 2018-09-20 DIAGNOSIS — R80.9 MICROALBUMINURIA DUE TO TYPE 2 DIABETES MELLITUS (HCC): ICD-10-CM

## 2018-09-20 DIAGNOSIS — R43.0 ANOSMIA: ICD-10-CM

## 2018-09-20 DIAGNOSIS — E11.29 MICROALBUMINURIA DUE TO TYPE 2 DIABETES MELLITUS (HCC): ICD-10-CM

## 2018-09-20 DIAGNOSIS — G62.9 SENSORY NEUROPATHY: Primary | ICD-10-CM

## 2018-09-20 DIAGNOSIS — E11.49 OTHER DIABETIC NEUROLOGICAL COMPLICATION ASSOCIATED WITH TYPE 2 DIABETES MELLITUS (HCC): ICD-10-CM

## 2018-09-20 DIAGNOSIS — R41.3 MEMORY LOSS: ICD-10-CM

## 2018-09-20 PROCEDURE — 99214 OFFICE O/P EST MOD 30 MIN: CPT | Performed by: PSYCHIATRY & NEUROLOGY

## 2018-09-20 PROCEDURE — 2022F DILAT RTA XM EVC RTNOPTHY: CPT | Performed by: PSYCHIATRY & NEUROLOGY

## 2018-09-20 PROCEDURE — 3044F HG A1C LEVEL LT 7.0%: CPT | Performed by: PSYCHIATRY & NEUROLOGY

## 2018-09-20 PROCEDURE — 1036F TOBACCO NON-USER: CPT | Performed by: PSYCHIATRY & NEUROLOGY

## 2018-09-20 PROCEDURE — G8427 DOCREV CUR MEDS BY ELIG CLIN: HCPCS | Performed by: PSYCHIATRY & NEUROLOGY

## 2018-09-20 PROCEDURE — G8417 CALC BMI ABV UP PARAM F/U: HCPCS | Performed by: PSYCHIATRY & NEUROLOGY

## 2018-09-20 RX ORDER — ATORVASTATIN CALCIUM 10 MG/1
10 TABLET, FILM COATED ORAL NIGHTLY
Qty: 30 TABLET | Refills: 0 | Status: SHIPPED | OUTPATIENT
Start: 2018-09-20 | End: 2018-11-27 | Stop reason: SDUPTHER

## 2018-09-20 RX ORDER — LISINOPRIL 2.5 MG/1
2.5 TABLET ORAL DAILY
Qty: 30 TABLET | Refills: 0 | Status: SHIPPED | OUTPATIENT
Start: 2018-09-20 | End: 2018-11-27 | Stop reason: SDUPTHER

## 2018-09-20 RX ORDER — METFORMIN HYDROCHLORIDE 750 MG/1
TABLET, EXTENDED RELEASE ORAL
Qty: 60 TABLET | Refills: 0 | Status: SHIPPED | OUTPATIENT
Start: 2018-09-20 | End: 2018-11-27 | Stop reason: SDUPTHER

## 2018-09-20 NOTE — PROGRESS NOTES
improvement of her anosmia. 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 This study did not demonstrate any electrodiagnostic evidence of lumbosacral radiculopathy or plexopathy in the nerves and muscles tested. Clinical correlation is recommended. Noncontrast head CT June 2018:  Negative CT brain with no acute intracranial abnormality.  Incidental   bilateral sphenoid sinusitis as described.      Xray T and L spine:   No acute osseous abnormality or significant degenerative change involving the   thoracic spine.       Mild degenerative changes posteriorly involving the lower lumbar spine.  No   acute osseous abnormality.      Xray L leg: Unremarkable radiographs left lower leg.     Lab Results   Component Value Date    LABA1C 6.3 (A) 07/20/2018     Lab Results   Component Value Date     05/21/2015     Lab Results   Component Value Date    TSH 0.77 06/22/2018     Lab Results   Component Value Date    [id number] 589 06/22/2018                             REVIEW OF SYSTEMS    CONSTITUTIONAL Weight: absent, Appetite: absent, Fatigue: present      HEENT Ears: normal, Visual disturbance: absent   RESPIRATORY Shortness of breath: absent, Cough: absent   CARDIOVASCULAR Chest pain: absent, Leg swelling :absent      GI Constipation: absent, Diarrhea: absent, Swallowing change: absent       Urinary frequency: present, Urinary urgency: present, Urinary incontinence: absent   MUSCULOSKELETAL Neck pain: absent, Back pain: present, Stiffness: absent, Muscle pain: absent, Joint pain: absent Restless legs: absent   DERMATOLOGIC Hair loss: absent, Skin changes: absent   NEUROLOGIC Memory loss: present, Confusion: absent, Seizures: absent Trouble walking or imbalance: absent, Dizziness: absent, Weakness: absent, Numbness: absent Tremor: absent, Spasm: absent, Speech difficulty: absent, Headache: absent, Light sensitivity: absent

## 2018-10-08 ENCOUNTER — INITIAL CONSULT (OUTPATIENT)
Dept: ENDOCRINOLOGY | Age: 48
End: 2018-10-08
Payer: MEDICARE

## 2018-10-08 ENCOUNTER — HOSPITAL ENCOUNTER (OUTPATIENT)
Age: 48
Setting detail: SPECIMEN
Discharge: HOME | End: 2018-10-08
Payer: MEDICARE

## 2018-10-08 VITALS
OXYGEN SATURATION: 97 % | HEIGHT: 68 IN | HEART RATE: 70 BPM | TEMPERATURE: 99 F | SYSTOLIC BLOOD PRESSURE: 98 MMHG | DIASTOLIC BLOOD PRESSURE: 60 MMHG | WEIGHT: 243 LBS | BODY MASS INDEX: 36.83 KG/M2

## 2018-10-08 DIAGNOSIS — E04.1 NODULAR THYROID DISEASE: ICD-10-CM

## 2018-10-08 DIAGNOSIS — R30.0 DYSURIA: ICD-10-CM

## 2018-10-08 DIAGNOSIS — E11.9 TYPE 2 DIABETES MELLITUS WITHOUT COMPLICATION, WITHOUT LONG-TERM CURRENT USE OF INSULIN (HCC): ICD-10-CM

## 2018-10-08 DIAGNOSIS — E04.1 NODULAR THYROID DISEASE: Primary | ICD-10-CM

## 2018-10-08 LAB
BILIRUBIN URINE: NEGATIVE
COLOR: YELLOW
COMMENT UA: ABNORMAL
GLUCOSE URINE: NEGATIVE
KETONES, URINE: ABNORMAL
LEUKOCYTE ESTERASE, URINE: NEGATIVE
NITRITE, URINE: NEGATIVE
PH UA: 5 (ref 5–8)
PROTEIN UA: NEGATIVE
SPECIFIC GRAVITY UA: 1.02 (ref 1–1.03)
TURBIDITY: CLEAR
URINE HGB: NEGATIVE
UROBILINOGEN, URINE: NORMAL

## 2018-10-08 PROCEDURE — 2022F DILAT RTA XM EVC RTNOPTHY: CPT | Performed by: INTERNAL MEDICINE

## 2018-10-08 PROCEDURE — 99214 OFFICE O/P EST MOD 30 MIN: CPT | Performed by: INTERNAL MEDICINE

## 2018-10-08 PROCEDURE — 1036F TOBACCO NON-USER: CPT | Performed by: INTERNAL MEDICINE

## 2018-10-08 PROCEDURE — 3044F HG A1C LEVEL LT 7.0%: CPT | Performed by: INTERNAL MEDICINE

## 2018-10-08 PROCEDURE — G8484 FLU IMMUNIZE NO ADMIN: HCPCS | Performed by: INTERNAL MEDICINE

## 2018-10-08 PROCEDURE — G8417 CALC BMI ABV UP PARAM F/U: HCPCS | Performed by: INTERNAL MEDICINE

## 2018-10-08 PROCEDURE — G8427 DOCREV CUR MEDS BY ELIG CLIN: HCPCS | Performed by: INTERNAL MEDICINE

## 2018-10-08 NOTE — PROGRESS NOTES
monitoring  01/17/2019    Diabetic foot exam  01/18/2019    Diabetic retinal exam  06/15/2019    A1C test (Diabetic or Prediabetic)  07/20/2019    Pneumococcal med risk  Completed    HIV screen  Completed

## 2018-10-08 NOTE — PROGRESS NOTES
2 CONTROLLED WITH DIET AND METFORMIN   MG BID. CHECKS SUGARS     SHE  HAS MILD HYPERCHOLESTEROLEMIA ON LIPITOR 10 MG DAILY    SHE IS ON SMALL DOSE OF LISINOPRIL 2.5 MG FOR  RENAL PROTECTION. NO HX OF HYPERTENSION    HX OF VIT D  DEFICIENCY. TOOK HIGH DOSE VIT D  FOR 16 WEEKS. HX OF ATTENTION DEFICIT DISORDER POSSIBLE DEPRESSION AND OCCASIONALLY FORGETFUL  . NO LEG EDEMA. NO CLAUDICATION    NO PARESTHESIAS IN FEET    NO SKIN RASHES       CURRENT MEDS :    METFORMIN  MG BID  LIPITOR 10 MG DAILY  LISINOPRIL 2.5 MG DAILY  ALBUTEROL INHALER PRN   FLUTICASONE NASAL SPRAY PRN      PAST HX AND SURGERIES :    TONSILLECTOMY      FAMILY HX :    SISTER HAD GOITER  BENIGN. HAD SURGERY      SOCIAL HX  :    NO SMOKING    NO ALCOHOL    NO RECREATIONAL DRUGS    SINGLE . HAS 3 CHILDREN      ALLERGIES :    CODEINE    ALL RECORDS FROM PCP REVIEWED. PAST HX, SURGICAL HX , FAMILY HX, SOCIAL HX AND ALLERGIES ALL REVIEWED.       Past Medical History:   Diagnosis Date    Asthma     Goiter     Morbid obesity ([facility] [place] [address]         Past Surgical History:   Procedure Laterality Date    CAUTERIZE INNER NOSE  08/31/2016    FRACTURE SURGERY      small toe on right foot  and left baby finger    SINUS SURGERY  08/31/2016    TONSILLECTOMY       Family History   Problem Relation Age of Onset    High Blood Pressure Mother     High Cholesterol Mother     Cancer Mother     Substance Abuse Father     Migraines Sister      Social History   Substance Use Topics    Smoking status: Never Smoker    Smokeless tobacco: Never Used    Alcohol use No        Current Outpatient Prescriptions   Medication Sig Dispense Refill    metFORMIN (GLUCOPHAGE-XR) 750 MG extended release tablet TAKE 1 TABLET TWICE A DAY 60 tablet 0    lisinopril (PRINIVIL;ZESTRIL) 2.5 MG tablet TAKE 1 TABLET BY MOUTH DAILY 30 tablet 0    atorvastatin (LIPITOR) 10 MG tablet TAKE 1 TABLET BY MOUTH NIGHTLY 30 tablet 0    albuterol sulfate HFA (VENTOLIN HFA) 108 (90  Reportedly, this was also previously biopsied and benign. No follow-up imaging is necessary for this nodule. HAD FINE NEEDLE THYROID IN PAST . REPORT NOT AVAILABLE.      6/22/2018 10:36 PM - LennyShruti Incoming Lab Results From [facility]     Component Results     Component Value Ref Range & Units Status Collected Lab   Vit D, 25-Hydroxy 14.3   30.0 - 100.0 ng/mL Final             6/22/2018 10:30 PM - Lenny, jose Incoming Lab Results From [facility]     Component Results     Component Value Ref Range & Units Status Collected Lab   TSH 0.77  0.30 - 5.00 mIU/L Final       4/17/2018  8:54 AM - Riri Mario     Component Results     Component Value Ref Range & Units Status Collected Lab   Hemoglobin A1C 6.2  %      4/17/2018 12:55 PM - Lenny, jose Incoming Lab Results From [facility]     Component Results     Component Value Ref Range & Units Status Collected Lab   Cholesterol 192  <200 mg/dL Final 01/17/2018 12:15  [facility]              HDL 38   >40 mg/dL Final 01/17/2018 12:15  [facility]          LDL Cholesterol 120  0 - 130 mg/dL Final 01/17/2018 12:15  [facility]         Direct (measured) LDL and calculated LDL are not interchangeable tests.     Chol/HDL Ratio 5.1   <5 Final 01/17/2018 12:15  [facility]          Triglycerides 169   <150 mg/dL Final 01/17/2018 12:15  [facility]        1/17/2018 12:55 PM - Lenny jose Incoming Lab Results From [facility]     Component Results     Component Value Ref Range & Units Status Collected Lab   Glucose 149   70 - 99 mg/dL Final 01/17/2018 12:15  [facility]   BUN 13  6 - 20 mg/dL Final 01/17/2018 12:15  [facility]   CREATININE 0.58  0.50 - 0.90 mg/dL Final 01/17/2018 12:15  [facility]   Bun/Cre Ratio NOT REPORTED  9 - 20 Final 01/17/2018 12:15  [facility]   Calcium 9.2  8.6 - 10.4 mg/dL Final 01/17/2018

## 2018-10-09 LAB
THYROGLOBULIN AB: <20 IU/ML (ref 0–40)
THYROID PEROXIDASE (TPO) AB: <10 IU/ML (ref 0–35)

## 2018-10-24 ENCOUNTER — OFFICE VISIT (OUTPATIENT)
Dept: FAMILY MEDICINE CLINIC | Age: 48
End: 2018-10-24
Payer: MEDICARE

## 2018-10-24 VITALS
SYSTOLIC BLOOD PRESSURE: 118 MMHG | OXYGEN SATURATION: 98 % | BODY MASS INDEX: 36.41 KG/M2 | TEMPERATURE: 97.5 F | HEART RATE: 86 BPM | WEIGHT: 239.4 LBS | DIASTOLIC BLOOD PRESSURE: 80 MMHG

## 2018-10-24 DIAGNOSIS — Z23 NEED FOR PROPHYLACTIC VACCINATION AND INOCULATION AGAINST INFLUENZA: ICD-10-CM

## 2018-10-24 DIAGNOSIS — E01.0 THYROMEGALY: ICD-10-CM

## 2018-10-24 DIAGNOSIS — E04.1 NODULAR THYROID DISEASE: ICD-10-CM

## 2018-10-24 DIAGNOSIS — R39.9 UTI SYMPTOMS: ICD-10-CM

## 2018-10-24 DIAGNOSIS — E11.9 WELL CONTROLLED DIABETES MELLITUS (HCC): Primary | ICD-10-CM

## 2018-10-24 DIAGNOSIS — J45.20 MILD INTERMITTENT ASTHMA WITHOUT COMPLICATION: ICD-10-CM

## 2018-10-24 DIAGNOSIS — R41.3 SHORT-TERM MEMORY LOSS: ICD-10-CM

## 2018-10-24 LAB
BILIRUBIN, POC: NORMAL
BLOOD URINE, POC: NORMAL
CLARITY, POC: NORMAL
COLOR, POC: NORMAL
GLUCOSE URINE, POC: NORMAL
KETONES, POC: NORMAL
LEUKOCYTE EST, POC: NORMAL
NITRITE, POC: NORMAL
PH, POC: 5
PROTEIN, POC: NORMAL
SPECIFIC GRAVITY, POC: 1.03
UROBILINOGEN, POC: 0.2

## 2018-10-24 PROCEDURE — 1036F TOBACCO NON-USER: CPT | Performed by: NURSE PRACTITIONER

## 2018-10-24 PROCEDURE — 2022F DILAT RTA XM EVC RTNOPTHY: CPT | Performed by: NURSE PRACTITIONER

## 2018-10-24 PROCEDURE — 99214 OFFICE O/P EST MOD 30 MIN: CPT | Performed by: NURSE PRACTITIONER

## 2018-10-24 PROCEDURE — 81002 URINALYSIS NONAUTO W/O SCOPE: CPT | Performed by: NURSE PRACTITIONER

## 2018-10-24 PROCEDURE — G8427 DOCREV CUR MEDS BY ELIG CLIN: HCPCS | Performed by: NURSE PRACTITIONER

## 2018-10-24 PROCEDURE — G8482 FLU IMMUNIZE ORDER/ADMIN: HCPCS | Performed by: NURSE PRACTITIONER

## 2018-10-24 PROCEDURE — 3044F HG A1C LEVEL LT 7.0%: CPT | Performed by: NURSE PRACTITIONER

## 2018-10-24 PROCEDURE — G8417 CALC BMI ABV UP PARAM F/U: HCPCS | Performed by: NURSE PRACTITIONER

## 2018-10-24 ASSESSMENT — ENCOUNTER SYMPTOMS
WHEEZING: 0
SHORTNESS OF BREATH: 0

## 2018-10-24 NOTE — PROGRESS NOTES
oriented to person, place, and time. Skin: Skin is warm and dry. No rash noted. No erythema. Psychiatric: She has a normal mood and affect. Her behavior is normal.   Nursing note and vitals reviewed. Data:     Lab Results   Component Value Date     01/17/2018    K 4.4 01/17/2018     01/17/2018    CO2 23 01/17/2018    BUN 13 01/17/2018    CREATININE 0.58 01/17/2018    GLUCOSE 122 03/14/2018    GLUCOSE 114 06/20/2016    PROT 7.4 01/17/2018    PROT 6.7 06/20/2016    LABALBU 4.1 01/17/2018    LABALBU 4.2 06/20/2016    BILITOT 0.42 01/17/2018    ALKPHOS 82 01/17/2018    AST 18 01/17/2018    ALT 25 01/17/2018     Lab Results   Component Value Date    WBC 10.5 01/10/2018    RBC 5.12 01/10/2018    RBC 4.59 06/20/2016    HGB 14.5 01/10/2018    HCT 44.7 01/10/2018    MCV 87.3 01/10/2018    MCH 28.3 01/10/2018    MCHC 32.4 01/10/2018    RDW 12.9 01/10/2018     01/10/2018    MPV 10.7 01/10/2018     Lab Results   Component Value Date    TSH 0.77 06/22/2018     Lab Results   Component Value Date    CHOL 192 01/17/2018    HDL 38 01/17/2018    LABA1C 6.3 07/20/2018       Assessment & Plan:      1. Well controlled diabetes mellitus ([facility] [address]  -Stable: Con't all medications as ordered, con't current tx plan  -Lifestyle changes discussed and encouraged: Decrease fats, sugars, carbohydrates, and increase routine exercise  -Will re-check labs:  - Hemoglobin A1C; Future    2. Mild intermittent asthma without complication  -Stable: Con't all medications as ordered, con't current tx plan  - Respiratory Therapy Supplies (NEBULIZER COMPRESSOR) KIT; 1 kit by Does not apply route once for 1 dose  Dispense: 1 kit; Refill: 0    3. UTI symptoms  -UA negative  -Will monitor  - POCT Urinalysis no Micro    4. Thyromegaly  -Stable: Con't all medications as ordered, con't f/u with endocrinology as scheduled, con't current tx plan    5.  Nodular thyroid disease  -Stable: Con't all medications as ordered, con't f/u with

## 2018-11-09 ENCOUNTER — OFFICE VISIT (OUTPATIENT)
Dept: ENDOCRINOLOGY | Age: 48
End: 2018-11-09
Payer: MEDICARE

## 2018-11-09 VITALS
SYSTOLIC BLOOD PRESSURE: 100 MMHG | DIASTOLIC BLOOD PRESSURE: 80 MMHG | BODY MASS INDEX: 36.83 KG/M2 | HEIGHT: 68 IN | TEMPERATURE: 97.9 F | WEIGHT: 243 LBS | OXYGEN SATURATION: 98 % | HEART RATE: 81 BPM

## 2018-11-09 DIAGNOSIS — E11.9 TYPE 2 DIABETES MELLITUS WITHOUT COMPLICATION, WITHOUT LONG-TERM CURRENT USE OF INSULIN (HCC): ICD-10-CM

## 2018-11-09 DIAGNOSIS — E04.2 MULTINODULAR GOITER: Primary | ICD-10-CM

## 2018-11-09 PROCEDURE — G8427 DOCREV CUR MEDS BY ELIG CLIN: HCPCS | Performed by: INTERNAL MEDICINE

## 2018-11-09 PROCEDURE — 2022F DILAT RTA XM EVC RTNOPTHY: CPT | Performed by: INTERNAL MEDICINE

## 2018-11-09 PROCEDURE — 3044F HG A1C LEVEL LT 7.0%: CPT | Performed by: INTERNAL MEDICINE

## 2018-11-09 PROCEDURE — 1036F TOBACCO NON-USER: CPT | Performed by: INTERNAL MEDICINE

## 2018-11-09 PROCEDURE — G8482 FLU IMMUNIZE ORDER/ADMIN: HCPCS | Performed by: INTERNAL MEDICINE

## 2018-11-09 PROCEDURE — G8417 CALC BMI ABV UP PARAM F/U: HCPCS | Performed by: INTERNAL MEDICINE

## 2018-11-09 PROCEDURE — 99213 OFFICE O/P EST LOW 20 MIN: CPT | Performed by: INTERNAL MEDICINE

## 2018-11-09 NOTE — PROGRESS NOTES
Chronic Disease Visit Information    BP Readings from Last 3 Encounters:   10/24/18 118/80   10/08/18 98/60   09/20/18 (!) 150/82          Hemoglobin A1C (%)   Date Value   07/20/2018 6.3 (A)   04/17/2018 6.2   01/10/2018 8.9     Microalb/Crt. Ratio (mcg/mg creat)   Date Value   07/20/2018 5     LDL Cholesterol (mg/dL)   Date Value   01/17/2018 120     HDL (mg/dL)   Date Value   01/17/2018 38 (L)     BUN (mg/dL)   Date Value   01/17/2018 13     CREATININE (mg/dL)   Date Value   01/17/2018 0.58     Glucose (mg/dL)   Date Value   03/14/2018 122   06/20/2016 114 (H)            Have you changed or started any medications since your last visit including any over-the-counter medicines, vitamins, or herbal medicines? no   Are you having any side effects from any of your medications? -  no  Have you stopped taking any of your medications? Is so, why? -  no    Have you seen any other physician or provider since your last visit? Yes - Records Obtained  Have you had any other diagnostic tests since your last visit? Yes - Records Obtained  Have you been seen in the emergency room and/or had an admission to a hospital since we last saw you? No  Have you had your annual diabetic retinal (eye) exam? No  Have you had your routine dental cleaning in the past 6 months? no    Have you activated your [employer] account? If not, what are your barriers?  Yes     Patient Care Team:  KAYLA Saab CNP as PCP - General (Nurse Practitioner Family)  KAYLA Saab CNP as PCP - S Attributed Provider  Charla Xiong MD (Endocrinology, Diabetes, & Metabolism)         Medical History Review  Past Medical, Family, and Social History reviewed and does contribute to the patient presenting condition    Health Maintenance   Topic Date Due    Cervical cancer screen  06/15/2018    DTaP/Tdap/Td vaccine (1 - Tdap) 01/18/2019 (Originally 6/10/1988)    Lipid screen  01/17/2019    Potassium monitoring  01/17/2019    Creatinine

## 2018-11-12 ENCOUNTER — HOSPITAL ENCOUNTER (OUTPATIENT)
Dept: MRI IMAGING | Age: 48
Discharge: HOME | End: 2018-11-14
Payer: MEDICARE

## 2018-11-12 ENCOUNTER — HOSPITAL ENCOUNTER (OUTPATIENT)
Age: 48
Discharge: HOME | End: 2018-11-12
Payer: MEDICARE

## 2018-11-12 DIAGNOSIS — E55.9 VITAMIN D DEFICIENCY: ICD-10-CM

## 2018-11-12 DIAGNOSIS — G62.9 SENSORY NEUROPATHY: ICD-10-CM

## 2018-11-12 DIAGNOSIS — R80.9 CONTROLLED TYPE 2 DIABETES MELLITUS WITH MICROALBUMINURIA, WITHOUT LONG-TERM CURRENT USE OF INSULIN (HCC): ICD-10-CM

## 2018-11-12 DIAGNOSIS — E55.9 VITAMIN D DEFICIENCY: Primary | ICD-10-CM

## 2018-11-12 DIAGNOSIS — E11.9 WELL CONTROLLED DIABETES MELLITUS (HCC): ICD-10-CM

## 2018-11-12 DIAGNOSIS — R43.0 ANOSMIA: ICD-10-CM

## 2018-11-12 DIAGNOSIS — E11.29 CONTROLLED TYPE 2 DIABETES MELLITUS WITH MICROALBUMINURIA, WITHOUT LONG-TERM CURRENT USE OF INSULIN (HCC): ICD-10-CM

## 2018-11-12 DIAGNOSIS — I10 ESSENTIAL HYPERTENSION: ICD-10-CM

## 2018-11-12 DIAGNOSIS — E78.2 HYPERLIPIDEMIA, MIXED: ICD-10-CM

## 2018-11-12 LAB
ALBUMIN SERPL-MCNC: 4.4 G/DL (ref 3.5–5.2)
ALBUMIN/GLOBULIN RATIO: ABNORMAL (ref 1–2.5)
ALP BLD-CCNC: 77 U/L (ref 35–104)
ALT SERPL-CCNC: 25 U/L (ref 5–33)
ANION GAP SERPL CALCULATED.3IONS-SCNC: 13 MMOL/L (ref 9–17)
AST SERPL-CCNC: 15 U/L
BILIRUB SERPL-MCNC: 0.33 MG/DL (ref 0.3–1.2)
BUN BLDV-MCNC: 11 MG/DL (ref 6–20)
BUN/CREAT BLD: ABNORMAL (ref 9–20)
CALCIUM SERPL-MCNC: 10.1 MG/DL (ref 8.6–10.4)
CHLORIDE BLD-SCNC: 102 MMOL/L (ref 98–107)
CHOLESTEROL/HDL RATIO: 2.8
CHOLESTEROL: 150 MG/DL
CO2: 24 MMOL/L (ref 20–31)
CREAT SERPL-MCNC: 0.63 MG/DL (ref 0.5–0.9)
GFR AFRICAN AMERICAN: >60 ML/MIN
GFR NON-AFRICAN AMERICAN: >60 ML/MIN
GFR SERPL CREATININE-BSD FRML MDRD: ABNORMAL ML/MIN/{1.73_M2}
GFR SERPL CREATININE-BSD FRML MDRD: ABNORMAL ML/MIN/{1.73_M2}
GLUCOSE BLD-MCNC: 141 MG/DL (ref 70–99)
HDLC SERPL-MCNC: 53 MG/DL
HOMOCYSTEINE: 10.5 UMOL/L
LDL CHOLESTEROL: 75 MG/DL (ref 0–130)
POTASSIUM SERPL-SCNC: 4.4 MMOL/L (ref 3.7–5.3)
SODIUM BLD-SCNC: 139 MMOL/L (ref 135–144)
TOTAL PROTEIN: 7.7 G/DL (ref 6.4–8.3)
TRIGL SERPL-MCNC: 111 MG/DL
VITAMIN D 25-HYDROXY: 14.4 NG/ML (ref 30–100)
VLDLC SERPL CALC-MCNC: NORMAL MG/DL (ref 1–30)

## 2018-11-12 PROCEDURE — 83036 HEMOGLOBIN GLYCOSYLATED A1C: CPT

## 2018-11-12 PROCEDURE — 86235 NUCLEAR ANTIGEN ANTIBODY: CPT

## 2018-11-12 PROCEDURE — 84155 ASSAY OF PROTEIN SERUM: CPT

## 2018-11-12 PROCEDURE — 82525 ASSAY OF COPPER: CPT

## 2018-11-12 PROCEDURE — 80061 LIPID PANEL: CPT

## 2018-11-12 PROCEDURE — A9579 GAD-BASE MR CONTRAST NOS,1ML: HCPCS | Performed by: PSYCHIATRY & NEUROLOGY

## 2018-11-12 PROCEDURE — 84165 PROTEIN E-PHORESIS SERUM: CPT

## 2018-11-12 PROCEDURE — 80053 COMPREHEN METABOLIC PANEL: CPT

## 2018-11-12 PROCEDURE — 70553 MRI BRAIN STEM W/O & W/DYE: CPT

## 2018-11-12 PROCEDURE — 84156 ASSAY OF PROTEIN URINE: CPT

## 2018-11-12 PROCEDURE — 2580000003 HC RX 258: Performed by: PSYCHIATRY & NEUROLOGY

## 2018-11-12 PROCEDURE — 84166 PROTEIN E-PHORESIS/URINE/CSF: CPT

## 2018-11-12 PROCEDURE — 84207 ASSAY OF VITAMIN B-6: CPT

## 2018-11-12 PROCEDURE — 83921 ORGANIC ACID SINGLE QUANT: CPT

## 2018-11-12 PROCEDURE — 83090 ASSAY OF HOMOCYSTEINE: CPT

## 2018-11-12 PROCEDURE — 6360000004 HC RX CONTRAST MEDICATION: Performed by: PSYCHIATRY & NEUROLOGY

## 2018-11-12 PROCEDURE — 36415 COLL VENOUS BLD VENIPUNCTURE: CPT

## 2018-11-12 PROCEDURE — 84446 ASSAY OF VITAMIN E: CPT

## 2018-11-12 PROCEDURE — 82306 VITAMIN D 25 HYDROXY: CPT

## 2018-11-12 RX ORDER — SODIUM CHLORIDE 0.9 % (FLUSH) 0.9 %
10 SYRINGE (ML) INJECTION PRN
Status: DISCONTINUED | OUTPATIENT
Start: 2018-11-12 | End: 2018-11-15 | Stop reason: HOSPADM

## 2018-11-12 RX ORDER — ERGOCALCIFEROL 1.25 MG/1
50000 CAPSULE ORAL WEEKLY
Qty: 4 CAPSULE | Refills: 1 | Status: SHIPPED | OUTPATIENT
Start: 2018-11-12 | End: 2019-01-24 | Stop reason: ALTCHOICE

## 2018-11-12 RX ADMIN — GADOTERIDOL 20 ML: 279.3 INJECTION, SOLUTION INTRAVENOUS at 15:14

## 2018-11-12 RX ADMIN — Medication 10 ML: at 15:14

## 2018-11-13 LAB
ANTI SSA: 30 U/ML
ANTI SSB: 44 U/ML
ESTIMATED AVERAGE GLUCOSE: 120 MG/DL
HBA1C MFR BLD: 5.8 % (ref 4–6)

## 2018-11-14 LAB
ALBUMIN (CALCULATED): 4.9 G/DL (ref 3.2–5.2)
ALBUMIN PERCENT: 69 % (ref 45–65)
ALPHA 1 PERCENT: 2 % (ref 3–6)
ALPHA 2 PERCENT: 9 % (ref 6–13)
ALPHA-1-GLOBULIN: 0.2 G/DL (ref 0.1–0.4)
ALPHA-2-GLOBULIN: 0.7 G/DL (ref 0.5–0.9)
BETA GLOBULIN: 0.7 G/DL (ref 0.5–1.1)
BETA PERCENT: 10 % (ref 11–19)
GAMMA GLOBULIN %: 10 % (ref 9–20)
GAMMA GLOBULIN: 0.7 G/DL (ref 0.5–1.5)
P E INTERPRETATION, U: NORMAL
PATHOLOGIST: ABNORMAL
PATHOLOGIST: NORMAL
PROTEIN ELECTROPHORESIS, SERUM: ABNORMAL
SPECIMEN TYPE: NORMAL
TOTAL PROT. SUM,%: 100 % (ref 98–102)
TOTAL PROT. SUM: 7.2 G/DL (ref 6.3–8.2)
TOTAL PROTEIN: 7.1 G/DL (ref 6.4–8.3)
URINE TOTAL PROTEIN: 20 MG/DL

## 2018-11-15 ENCOUNTER — OFFICE VISIT (OUTPATIENT)
Dept: NEUROLOGY | Age: 48
End: 2018-11-15
Payer: MEDICARE

## 2018-11-15 VITALS
WEIGHT: 243.2 LBS | SYSTOLIC BLOOD PRESSURE: 126 MMHG | HEART RATE: 73 BPM | BODY MASS INDEX: 36.86 KG/M2 | DIASTOLIC BLOOD PRESSURE: 81 MMHG | HEIGHT: 68 IN

## 2018-11-15 DIAGNOSIS — G60.8 SENSORY POLYNEUROPATHY: ICD-10-CM

## 2018-11-15 DIAGNOSIS — F41.9 ANXIETY AND DEPRESSION: ICD-10-CM

## 2018-11-15 DIAGNOSIS — F32.A ANXIETY AND DEPRESSION: ICD-10-CM

## 2018-11-15 DIAGNOSIS — R41.3 MEMORY LOSS: Primary | ICD-10-CM

## 2018-11-15 DIAGNOSIS — G47.33 OSA (OBSTRUCTIVE SLEEP APNEA): ICD-10-CM

## 2018-11-15 LAB
COPPER: 125 UG/DL (ref 80–155)
METHYLMALONIC ACID: 0.19 UMOL/L (ref 0–0.4)

## 2018-11-15 PROCEDURE — 99214 OFFICE O/P EST MOD 30 MIN: CPT | Performed by: PSYCHIATRY & NEUROLOGY

## 2018-11-15 PROCEDURE — G8427 DOCREV CUR MEDS BY ELIG CLIN: HCPCS | Performed by: PSYCHIATRY & NEUROLOGY

## 2018-11-15 PROCEDURE — 1036F TOBACCO NON-USER: CPT | Performed by: PSYCHIATRY & NEUROLOGY

## 2018-11-15 PROCEDURE — G8417 CALC BMI ABV UP PARAM F/U: HCPCS | Performed by: PSYCHIATRY & NEUROLOGY

## 2018-11-15 PROCEDURE — G8482 FLU IMMUNIZE ORDER/ADMIN: HCPCS | Performed by: PSYCHIATRY & NEUROLOGY

## 2018-11-15 NOTE — PROGRESS NOTES
[facility]  Offices: [address], [place], [address] [facility]  [address] [facility], [address], [place], [address] [facility]  [address] [facility] [address], [address]  Phone: [phone]  Fax: [phone]    MD Wellington Walter, MD Fortino Mcgraw MD Carolyne Breeze, MD Lawerence Barlow, CNP    11/15/2018      HISTORY OF PRESENT ILLNESS:       I had the pleasure of seeing Paxton Pace, who returns for continuing neurologic care fornumbness and tingling in the anterior left leg (resolved, onset in June 2018, memory loss (onset in 2017) and anosmia. On her last visit, I proceeded with lab work to look into other causes of neuropathy, which so far has been unremarkable. Her vitamin B6, vitamin E, serum copper are still pending. Her most recent A1c is improved to 5.8. She also had a recent MRI of the brain with and without contrast for her history of an nausea, which came back remarkable other than for sinusitis, which she was treated with antibiotics for recently. For her memory loss, she recently underwent neuropsychological testing at [facility] which showed no evidence of any cognitive slowing. In fact, she actually tested above average on many domains. She did have problems with attention, that is most likely secondary to depression. Patient says she gets very distracted easily and is scatter brained. She is not on antidepressants, and does not want a take them for now. Of note, patient reports she was diagnosed with sleep apnea about a year and a half to 2 years ago and was actually set up with CPAP. However, she has never used it because she had has just been so busy personally. She reports snoring, unrefreshing sleep and daytime sleepiness. She is willing to give it a try to see if it could help with her memory and fatigue. She denies any new complaints, no new medications.       Prior testing reviewed:  MRI brain with and without EXAMINATION      Mental status    Alert and oriented x 3; intact memory with no confusion, speech or language problems; no hallucinations or delusions  Fund of information appropriate for level of education    Cranial nerves    II - visual fields intact to confrontation , fundoscopy showed no  papiledema                                                III, IV, VI - extra-ocular muscles full: no pupillary defect; no HILLARY, no nystagmus, no ptosis   V - normal facial sensation                                                               VII - normal facial symmetry                                                             VIII - intact hearing                                                                             IX, X - symmetrical palate                                                                  XI - symmetrical shoulder shrug                                                       XII - tongue midline without atrophy or fasciculation      Motor function  Normal muscle bulk and tone; strength 5/5 on all 4 extremities, no pronator drift      Sensory function Intact to light touch, pinprick, vibration, proprioception on all 4 extremities      Cerebellar Intact fine motor movement. No involuntary movements or tremors. No ataxia or dysmetria on finger to nose or heel to shin testing      Reflex function DTR 2+ on bilateral UE and LE, symmetric. Negative Babinski      Gait                   normal base and arm swing              ASSESSMENT AND PLAN:       This is a 51 y.o. female who comes in for follow up for Memory loss, mostly attentional issues secondary to depression as seen on neuropsychological testing. She also has sensory polyneuropathy likely secondary to diabetes but currently not symptomatic. Discussed options with the patient. Encourage her to start using her CPAP nightly for as long as she can. Advised her to call me if she encounters any discomfort or problems.  Told her.using her CPAP can

## 2018-11-16 LAB
ALPHA-TOCOPHEROL: 7.5 MG/L (ref 5.5–18)
GAMMA-TOCOPHEROL: 1.1 MG/L (ref 0–6)
VITAMIN B6: 40.7 NMOL/L (ref 20–125)

## 2018-11-20 DIAGNOSIS — R80.9 PROTEINURIA, UNSPECIFIED TYPE: Primary | ICD-10-CM

## 2018-11-27 DIAGNOSIS — I10 ESSENTIAL HYPERTENSION: ICD-10-CM

## 2018-11-27 DIAGNOSIS — E11.9 CONTROLLED TYPE 2 DIABETES MELLITUS WITHOUT COMPLICATION, WITHOUT LONG-TERM CURRENT USE OF INSULIN (HCC): ICD-10-CM

## 2018-11-27 DIAGNOSIS — R80.9 MICROALBUMINURIA DUE TO TYPE 2 DIABETES MELLITUS (HCC): ICD-10-CM

## 2018-11-27 DIAGNOSIS — E78.2 HYPERLIPIDEMIA, MIXED: ICD-10-CM

## 2018-11-27 DIAGNOSIS — E11.29 MICROALBUMINURIA DUE TO TYPE 2 DIABETES MELLITUS (HCC): ICD-10-CM

## 2018-11-28 RX ORDER — METFORMIN HYDROCHLORIDE 750 MG/1
TABLET, EXTENDED RELEASE ORAL
Qty: 60 TABLET | Refills: 11 | Status: SHIPPED | OUTPATIENT
Start: 2018-11-28 | End: 2019-12-09 | Stop reason: SDUPTHER

## 2018-11-28 RX ORDER — LISINOPRIL 2.5 MG/1
2.5 TABLET ORAL DAILY
Qty: 30 TABLET | Refills: 11 | Status: SHIPPED | OUTPATIENT
Start: 2018-11-28 | End: 2019-12-09 | Stop reason: SDUPTHER

## 2018-11-28 RX ORDER — PEN NEEDLE, DIABETIC 32GX 5/32"
NEEDLE, DISPOSABLE MISCELLANEOUS
Qty: 100 EACH | Refills: 3 | Status: SHIPPED | OUTPATIENT
Start: 2018-11-28 | End: 2019-02-28 | Stop reason: SDUPTHER

## 2018-11-28 RX ORDER — ATORVASTATIN CALCIUM 10 MG/1
10 TABLET, FILM COATED ORAL NIGHTLY
Qty: 30 TABLET | Refills: 11 | Status: SHIPPED | OUTPATIENT
Start: 2018-11-28 | End: 2019-12-09 | Stop reason: SDUPTHER

## 2018-11-28 RX ORDER — LANCETS 33 GAUGE
EACH MISCELLANEOUS
Qty: 100 EACH | Refills: 3 | Status: SHIPPED | OUTPATIENT
Start: 2018-11-28 | End: 2019-02-28 | Stop reason: SDUPTHER

## 2018-12-27 DIAGNOSIS — E55.9 VITAMIN D DEFICIENCY: ICD-10-CM

## 2018-12-27 RX ORDER — ERGOCALCIFEROL 1.25 MG/1
50000 CAPSULE ORAL WEEKLY
Qty: 4 CAPSULE | Refills: 0 | OUTPATIENT
Start: 2018-12-27 | End: 2019-02-15

## 2019-01-04 PROBLEM — E13.22 SECONDARY DIABETES MELLITUS WITH STAGE 1 CHRONIC KIDNEY DISEASE (HCC): Status: ACTIVE | Noted: 2019-01-04

## 2019-01-04 PROBLEM — N18.1 CKD (CHRONIC KIDNEY DISEASE) STAGE 1, GFR 90 ML/MIN OR GREATER: Status: ACTIVE | Noted: 2019-01-04

## 2019-01-04 PROBLEM — N18.1 SECONDARY DIABETES MELLITUS WITH STAGE 1 CHRONIC KIDNEY DISEASE (HCC): Status: ACTIVE | Noted: 2019-01-04

## 2019-01-04 PROBLEM — I12.9 BENIGN HYPERTENSION WITH CKD (CHRONIC KIDNEY DISEASE) STAGE I: Status: ACTIVE | Noted: 2019-01-04

## 2019-01-04 PROBLEM — N18.1 BENIGN HYPERTENSION WITH CKD (CHRONIC KIDNEY DISEASE) STAGE I: Status: ACTIVE | Noted: 2019-01-04

## 2019-01-09 ENCOUNTER — HOSPITAL ENCOUNTER (OUTPATIENT)
Age: 49
Discharge: HOME | End: 2019-01-09
Payer: MEDICARE

## 2019-01-09 DIAGNOSIS — I12.9 BENIGN HYPERTENSION WITH CKD (CHRONIC KIDNEY DISEASE) STAGE I: ICD-10-CM

## 2019-01-09 DIAGNOSIS — N18.1 CKD (CHRONIC KIDNEY DISEASE) STAGE 1, GFR 90 ML/MIN OR GREATER: ICD-10-CM

## 2019-01-09 DIAGNOSIS — E11.29 MICROALBUMINURIA DUE TO TYPE 2 DIABETES MELLITUS (HCC): ICD-10-CM

## 2019-01-09 DIAGNOSIS — E13.22 SECONDARY DIABETES MELLITUS WITH STAGE 1 CHRONIC KIDNEY DISEASE (HCC): ICD-10-CM

## 2019-01-09 DIAGNOSIS — R80.9 MICROALBUMINURIA DUE TO TYPE 2 DIABETES MELLITUS (HCC): ICD-10-CM

## 2019-01-09 DIAGNOSIS — N18.1 BENIGN HYPERTENSION WITH CKD (CHRONIC KIDNEY DISEASE) STAGE I: ICD-10-CM

## 2019-01-09 DIAGNOSIS — N18.1 SECONDARY DIABETES MELLITUS WITH STAGE 1 CHRONIC KIDNEY DISEASE (HCC): ICD-10-CM

## 2019-01-09 LAB
ABSOLUTE EOS #: 0.1 K/UL (ref 0–0.4)
ABSOLUTE IMMATURE GRANULOCYTE: NORMAL K/UL (ref 0–0.3)
ABSOLUTE LYMPH #: 3 K/UL (ref 1–4.8)
ABSOLUTE MONO #: 0.5 K/UL (ref 0.1–1.3)
ALBUMIN SERPL-MCNC: 4.3 G/DL (ref 3.5–5.2)
ALBUMIN/GLOBULIN RATIO: ABNORMAL (ref 1–2.5)
ALP BLD-CCNC: 82 U/L (ref 35–104)
ALT SERPL-CCNC: 20 U/L (ref 5–33)
ANION GAP SERPL CALCULATED.3IONS-SCNC: 12 MMOL/L (ref 9–17)
AST SERPL-CCNC: 12 U/L
BASOPHILS # BLD: 1 % (ref 0–2)
BASOPHILS ABSOLUTE: 0 K/UL (ref 0–0.2)
BILIRUB SERPL-MCNC: 0.36 MG/DL (ref 0.3–1.2)
BUN BLDV-MCNC: 10 MG/DL (ref 6–20)
BUN/CREAT BLD: ABNORMAL (ref 9–20)
CALCIUM SERPL-MCNC: 9.1 MG/DL (ref 8.6–10.4)
CHLORIDE BLD-SCNC: 100 MMOL/L (ref 98–107)
CO2: 25 MMOL/L (ref 20–31)
CREAT SERPL-MCNC: 0.6 MG/DL (ref 0.5–0.9)
DIFFERENTIAL TYPE: NORMAL
EOSINOPHILS RELATIVE PERCENT: 2 % (ref 0–4)
GFR AFRICAN AMERICAN: >60 ML/MIN
GFR NON-AFRICAN AMERICAN: >60 ML/MIN
GFR SERPL CREATININE-BSD FRML MDRD: ABNORMAL ML/MIN/{1.73_M2}
GFR SERPL CREATININE-BSD FRML MDRD: ABNORMAL ML/MIN/{1.73_M2}
GLUCOSE BLD-MCNC: 150 MG/DL (ref 70–99)
HCT VFR BLD CALC: 44.7 % (ref 36–46)
HEMOGLOBIN: 14.8 G/DL (ref 12–16)
IMMATURE GRANULOCYTES: NORMAL %
LYMPHOCYTES # BLD: 32 % (ref 24–44)
MAGNESIUM: 1.9 MG/DL (ref 1.6–2.6)
MCH RBC QN AUTO: 29.4 PG (ref 26–34)
MCHC RBC AUTO-ENTMCNC: 33.2 G/DL (ref 31–37)
MCV RBC AUTO: 88.5 FL (ref 80–100)
MONOCYTES # BLD: 6 % (ref 1–7)
NRBC AUTOMATED: NORMAL PER 100 WBC
PDW BLD-RTO: 13.4 % (ref 11.5–14.9)
PHOSPHORUS: 4.3 MG/DL (ref 2.6–4.5)
PLATELET # BLD: 291 K/UL (ref 150–450)
PLATELET ESTIMATE: NORMAL
PMV BLD AUTO: 8.4 FL (ref 6–12)
POTASSIUM SERPL-SCNC: 4 MMOL/L (ref 3.7–5.3)
RBC # BLD: 5.05 M/UL (ref 4–5.2)
RBC # BLD: NORMAL 10*6/UL
SEG NEUTROPHILS: 59 % (ref 36–66)
SEGMENTED NEUTROPHILS ABSOLUTE COUNT: 5.6 K/UL (ref 1.3–9.1)
SODIUM BLD-SCNC: 137 MMOL/L (ref 135–144)
TOTAL PROTEIN: 7.3 G/DL (ref 6.4–8.3)
WBC # BLD: 9.3 K/UL (ref 3.5–11)
WBC # BLD: NORMAL 10*3/UL

## 2019-01-09 PROCEDURE — 84100 ASSAY OF PHOSPHORUS: CPT

## 2019-01-09 PROCEDURE — 85025 COMPLETE CBC W/AUTO DIFF WBC: CPT

## 2019-01-09 PROCEDURE — 80053 COMPREHEN METABOLIC PANEL: CPT

## 2019-01-09 PROCEDURE — 36415 COLL VENOUS BLD VENIPUNCTURE: CPT

## 2019-01-09 PROCEDURE — 83735 ASSAY OF MAGNESIUM: CPT

## 2019-01-24 ENCOUNTER — HOSPITAL ENCOUNTER (OUTPATIENT)
Age: 49
Discharge: HOME | End: 2019-01-24
Payer: MEDICARE

## 2019-01-24 ENCOUNTER — HOSPITAL ENCOUNTER (OUTPATIENT)
Age: 49
Discharge: HOME | End: 2019-01-26
Payer: MEDICARE

## 2019-01-24 ENCOUNTER — HOSPITAL ENCOUNTER (OUTPATIENT)
Dept: GENERAL RADIOLOGY | Age: 49
Discharge: HOME | End: 2019-01-26
Payer: MEDICARE

## 2019-01-24 ENCOUNTER — OFFICE VISIT (OUTPATIENT)
Dept: FAMILY MEDICINE CLINIC | Age: 49
End: 2019-01-24
Payer: MEDICARE

## 2019-01-24 ENCOUNTER — TELEPHONE (OUTPATIENT)
Dept: FAMILY MEDICINE CLINIC | Age: 49
End: 2019-01-24

## 2019-01-24 VITALS
HEIGHT: 68 IN | OXYGEN SATURATION: 98 % | BODY MASS INDEX: 37.44 KG/M2 | DIASTOLIC BLOOD PRESSURE: 70 MMHG | SYSTOLIC BLOOD PRESSURE: 112 MMHG | HEART RATE: 65 BPM | WEIGHT: 247 LBS

## 2019-01-24 DIAGNOSIS — J45.20 MILD INTERMITTENT ASTHMA WITHOUT COMPLICATION: ICD-10-CM

## 2019-01-24 DIAGNOSIS — R80.9 TYPE 2 DIABETES MELLITUS WITH MICROALBUMINURIA, WITHOUT LONG-TERM CURRENT USE OF INSULIN (HCC): Primary | ICD-10-CM

## 2019-01-24 DIAGNOSIS — E13.22 SECONDARY DIABETES MELLITUS WITH STAGE 1 CHRONIC KIDNEY DISEASE (HCC): ICD-10-CM

## 2019-01-24 DIAGNOSIS — E04.1 NODULAR THYROID DISEASE: ICD-10-CM

## 2019-01-24 DIAGNOSIS — J01.30 ACUTE SPHENOIDAL SINUSITIS, RECURRENCE NOT SPECIFIED: ICD-10-CM

## 2019-01-24 DIAGNOSIS — E55.9 VITAMIN D DEFICIENCY: ICD-10-CM

## 2019-01-24 DIAGNOSIS — R51.9 SCALP TENDERNESS: ICD-10-CM

## 2019-01-24 DIAGNOSIS — I49.8 SINUS ARRHYTHMIA: ICD-10-CM

## 2019-01-24 DIAGNOSIS — E11.29 MICROALBUMINURIA DUE TO TYPE 2 DIABETES MELLITUS (HCC): ICD-10-CM

## 2019-01-24 DIAGNOSIS — E11.29 TYPE 2 DIABETES MELLITUS WITH MICROALBUMINURIA, WITHOUT LONG-TERM CURRENT USE OF INSULIN (HCC): Primary | ICD-10-CM

## 2019-01-24 DIAGNOSIS — I49.3 PVC (PREMATURE VENTRICULAR CONTRACTION): ICD-10-CM

## 2019-01-24 DIAGNOSIS — R43.0 ANOSMIA: Primary | ICD-10-CM

## 2019-01-24 DIAGNOSIS — R80.9 MICROALBUMINURIA DUE TO TYPE 2 DIABETES MELLITUS (HCC): ICD-10-CM

## 2019-01-24 DIAGNOSIS — I49.9 IRREGULAR HEART RHYTHM: ICD-10-CM

## 2019-01-24 DIAGNOSIS — N18.1 SECONDARY DIABETES MELLITUS WITH STAGE 1 CHRONIC KIDNEY DISEASE (HCC): ICD-10-CM

## 2019-01-24 DIAGNOSIS — N18.1 BENIGN HYPERTENSION WITH CKD (CHRONIC KIDNEY DISEASE) STAGE I: ICD-10-CM

## 2019-01-24 DIAGNOSIS — I12.9 BENIGN HYPERTENSION WITH CKD (CHRONIC KIDNEY DISEASE) STAGE I: ICD-10-CM

## 2019-01-24 DIAGNOSIS — L73.9 FOLLICULITIS OF LEFT AXILLA: ICD-10-CM

## 2019-01-24 DIAGNOSIS — J32.4 CHRONIC PANSINUSITIS: ICD-10-CM

## 2019-01-24 DIAGNOSIS — Z23 NEED FOR TDAP VACCINATION: ICD-10-CM

## 2019-01-24 PROBLEM — R07.9 CHEST PAIN: Status: RESOLVED | Noted: 2017-04-19 | Resolved: 2019-01-24

## 2019-01-24 PROBLEM — M79.671 CHRONIC PAIN OF RIGHT HEEL: Status: RESOLVED | Noted: 2018-01-18 | Resolved: 2019-01-24

## 2019-01-24 PROBLEM — R43.8 HYPOSMIA: Status: RESOLVED | Noted: 2018-07-20 | Resolved: 2019-01-24

## 2019-01-24 PROBLEM — G89.29 CHRONIC PAIN OF RIGHT HEEL: Status: RESOLVED | Noted: 2018-01-18 | Resolved: 2019-01-24

## 2019-01-24 PROBLEM — R09.81 NASAL CONGESTION: Status: RESOLVED | Noted: 2017-04-19 | Resolved: 2019-01-24

## 2019-01-24 PROBLEM — Z87.81 HISTORY OF SKULL FRACTURE: Status: RESOLVED | Noted: 2017-04-19 | Resolved: 2019-01-24

## 2019-01-24 PROBLEM — R00.2 PALPITATIONS: Status: RESOLVED | Noted: 2017-04-19 | Resolved: 2019-01-24

## 2019-01-24 LAB
EKG ATRIAL RATE: 73 BPM
EKG P AXIS: 26 DEGREES
EKG P-R INTERVAL: 134 MS
EKG Q-T INTERVAL: 398 MS
EKG QRS DURATION: 80 MS
EKG QTC CALCULATION (BAZETT): 438 MS
EKG R AXIS: 19 DEGREES
EKG T AXIS: 47 DEGREES
EKG VENTRICULAR RATE: 73 BPM
VITAMIN D 25-HYDROXY: 14.2 NG/ML (ref 30–100)

## 2019-01-24 PROCEDURE — G8417 CALC BMI ABV UP PARAM F/U: HCPCS | Performed by: NURSE PRACTITIONER

## 2019-01-24 PROCEDURE — 70260 X-RAY EXAM OF SKULL: CPT

## 2019-01-24 PROCEDURE — 90715 TDAP VACCINE 7 YRS/> IM: CPT | Performed by: NURSE PRACTITIONER

## 2019-01-24 PROCEDURE — 90471 IMMUNIZATION ADMIN: CPT | Performed by: NURSE PRACTITIONER

## 2019-01-24 PROCEDURE — 36415 COLL VENOUS BLD VENIPUNCTURE: CPT

## 2019-01-24 PROCEDURE — G8427 DOCREV CUR MEDS BY ELIG CLIN: HCPCS | Performed by: NURSE PRACTITIONER

## 2019-01-24 PROCEDURE — 99214 OFFICE O/P EST MOD 30 MIN: CPT | Performed by: NURSE PRACTITIONER

## 2019-01-24 PROCEDURE — 2022F DILAT RTA XM EVC RTNOPTHY: CPT | Performed by: NURSE PRACTITIONER

## 2019-01-24 PROCEDURE — 3046F HEMOGLOBIN A1C LEVEL >9.0%: CPT | Performed by: NURSE PRACTITIONER

## 2019-01-24 PROCEDURE — 1036F TOBACCO NON-USER: CPT | Performed by: NURSE PRACTITIONER

## 2019-01-24 PROCEDURE — G8482 FLU IMMUNIZE ORDER/ADMIN: HCPCS | Performed by: NURSE PRACTITIONER

## 2019-01-24 PROCEDURE — 82306 VITAMIN D 25 HYDROXY: CPT

## 2019-01-24 PROCEDURE — 93005 ELECTROCARDIOGRAM TRACING: CPT

## 2019-01-24 RX ORDER — MUPIROCIN CALCIUM 20 MG/G
CREAM TOPICAL
Qty: 1 TUBE | Refills: 0 | Status: SHIPPED | OUTPATIENT
Start: 2019-01-24 | End: 2019-02-27 | Stop reason: SDUPTHER

## 2019-01-24 RX ORDER — IPRATROPIUM BROMIDE AND ALBUTEROL SULFATE 2.5; .5 MG/3ML; MG/3ML
3 SOLUTION RESPIRATORY (INHALATION) EVERY 4 HOURS PRN
Qty: 360 ML | Refills: 1 | Status: SHIPPED | OUTPATIENT
Start: 2019-01-24 | End: 2019-03-27 | Stop reason: SDUPTHER

## 2019-01-24 ASSESSMENT — ENCOUNTER SYMPTOMS
WHEEZING: 0
VOMITING: 0
DIARRHEA: 0
NAUSEA: 0
BLURRED VISION: 1
SHORTNESS OF BREATH: 0
CHEST TIGHTNESS: 1
VISUAL CHANGE: 0
SORE THROAT: 0

## 2019-01-25 DIAGNOSIS — E55.9 VITAMIN D DEFICIENCY: Primary | ICD-10-CM

## 2019-01-25 RX ORDER — ERGOCALCIFEROL 1.25 MG/1
50000 CAPSULE ORAL WEEKLY
Qty: 4 CAPSULE | Refills: 1 | Status: SHIPPED | OUTPATIENT
Start: 2019-01-25 | End: 2019-06-25 | Stop reason: ALTCHOICE

## 2019-01-25 RX ORDER — MELATONIN
1 DAILY
Qty: 30 TABLET | Refills: 3 | Status: SHIPPED | OUTPATIENT
Start: 2019-01-25 | End: 2019-05-30 | Stop reason: SDUPTHER

## 2019-01-25 RX ORDER — AMOXICILLIN AND CLAVULANATE POTASSIUM 875; 125 MG/1; MG/1
1 TABLET, FILM COATED ORAL 2 TIMES DAILY WITH MEALS
Qty: 14 TABLET | Refills: 0 | Status: SHIPPED | OUTPATIENT
Start: 2019-01-25 | End: 2019-02-01

## 2019-02-11 ENCOUNTER — HOSPITAL ENCOUNTER (OUTPATIENT)
Dept: NON INVASIVE DIAGNOSTICS | Age: 49
Discharge: HOME | End: 2019-02-11
Payer: MEDICARE

## 2019-02-11 LAB
LV EF: 55 %
LVEF MODALITY: NORMAL

## 2019-02-11 PROCEDURE — 93306 TTE W/DOPPLER COMPLETE: CPT

## 2019-02-20 ENCOUNTER — OFFICE VISIT (OUTPATIENT)
Dept: FAMILY MEDICINE CLINIC | Age: 49
End: 2019-02-20
Payer: MEDICARE

## 2019-02-20 ENCOUNTER — OFFICE VISIT (OUTPATIENT)
Dept: NEUROLOGY | Age: 49
End: 2019-02-20
Payer: MEDICARE

## 2019-02-20 ENCOUNTER — TELEPHONE (OUTPATIENT)
Dept: FAMILY MEDICINE CLINIC | Age: 49
End: 2019-02-20

## 2019-02-20 VITALS
SYSTOLIC BLOOD PRESSURE: 134 MMHG | HEART RATE: 60 BPM | DIASTOLIC BLOOD PRESSURE: 68 MMHG | WEIGHT: 248 LBS | BODY MASS INDEX: 37.59 KG/M2 | HEIGHT: 68 IN

## 2019-02-20 VITALS
SYSTOLIC BLOOD PRESSURE: 106 MMHG | TEMPERATURE: 97.4 F | DIASTOLIC BLOOD PRESSURE: 78 MMHG | RESPIRATION RATE: 16 BRPM | BODY MASS INDEX: 37.71 KG/M2 | OXYGEN SATURATION: 97 % | WEIGHT: 248 LBS | HEART RATE: 68 BPM

## 2019-02-20 DIAGNOSIS — R80.9 TYPE 2 DIABETES MELLITUS WITH MICROALBUMINURIA, WITHOUT LONG-TERM CURRENT USE OF INSULIN (HCC): ICD-10-CM

## 2019-02-20 DIAGNOSIS — J01.00 SUBACUTE MAXILLARY SINUSITIS: Primary | ICD-10-CM

## 2019-02-20 DIAGNOSIS — F32.A ANXIETY AND DEPRESSION: ICD-10-CM

## 2019-02-20 DIAGNOSIS — G47.33 OSA ON CPAP: ICD-10-CM

## 2019-02-20 DIAGNOSIS — Z99.89 OSA ON CPAP: ICD-10-CM

## 2019-02-20 DIAGNOSIS — F41.9 ANXIETY AND DEPRESSION: ICD-10-CM

## 2019-02-20 DIAGNOSIS — E11.29 TYPE 2 DIABETES MELLITUS WITH MICROALBUMINURIA, WITHOUT LONG-TERM CURRENT USE OF INSULIN (HCC): ICD-10-CM

## 2019-02-20 DIAGNOSIS — R41.3 MEMORY LOSS: Primary | ICD-10-CM

## 2019-02-20 PROCEDURE — G8482 FLU IMMUNIZE ORDER/ADMIN: HCPCS | Performed by: NURSE PRACTITIONER

## 2019-02-20 PROCEDURE — G8427 DOCREV CUR MEDS BY ELIG CLIN: HCPCS | Performed by: PSYCHIATRY & NEUROLOGY

## 2019-02-20 PROCEDURE — 99214 OFFICE O/P EST MOD 30 MIN: CPT | Performed by: NURSE PRACTITIONER

## 2019-02-20 PROCEDURE — 1036F TOBACCO NON-USER: CPT | Performed by: PSYCHIATRY & NEUROLOGY

## 2019-02-20 PROCEDURE — 2022F DILAT RTA XM EVC RTNOPTHY: CPT | Performed by: PSYCHIATRY & NEUROLOGY

## 2019-02-20 PROCEDURE — G8417 CALC BMI ABV UP PARAM F/U: HCPCS | Performed by: NURSE PRACTITIONER

## 2019-02-20 PROCEDURE — 99214 OFFICE O/P EST MOD 30 MIN: CPT | Performed by: PSYCHIATRY & NEUROLOGY

## 2019-02-20 PROCEDURE — 1036F TOBACCO NON-USER: CPT | Performed by: NURSE PRACTITIONER

## 2019-02-20 PROCEDURE — 3046F HEMOGLOBIN A1C LEVEL >9.0%: CPT | Performed by: PSYCHIATRY & NEUROLOGY

## 2019-02-20 PROCEDURE — G8427 DOCREV CUR MEDS BY ELIG CLIN: HCPCS | Performed by: NURSE PRACTITIONER

## 2019-02-20 PROCEDURE — G8417 CALC BMI ABV UP PARAM F/U: HCPCS | Performed by: PSYCHIATRY & NEUROLOGY

## 2019-02-20 PROCEDURE — G8482 FLU IMMUNIZE ORDER/ADMIN: HCPCS | Performed by: PSYCHIATRY & NEUROLOGY

## 2019-02-20 RX ORDER — FLUTICASONE PROPIONATE 50 MCG
2 SPRAY, SUSPENSION (ML) NASAL DAILY
Qty: 1 BOTTLE | Refills: 0 | Status: SHIPPED
Start: 2019-02-20

## 2019-02-20 RX ORDER — DOXYCYCLINE HYCLATE 100 MG/1
100 CAPSULE ORAL 2 TIMES DAILY
Qty: 20 CAPSULE | Refills: 0 | Status: SHIPPED | OUTPATIENT
Start: 2019-02-20 | End: 2019-02-28 | Stop reason: ALTCHOICE

## 2019-02-20 ASSESSMENT — PATIENT HEALTH QUESTIONNAIRE - PHQ9
2. FEELING DOWN, DEPRESSED OR HOPELESS: 1
1. LITTLE INTEREST OR PLEASURE IN DOING THINGS: 0
SUM OF ALL RESPONSES TO PHQ QUESTIONS 1-9: 1
SUM OF ALL RESPONSES TO PHQ QUESTIONS 1-9: 1
SUM OF ALL RESPONSES TO PHQ9 QUESTIONS 1 & 2: 1

## 2019-02-20 ASSESSMENT — ENCOUNTER SYMPTOMS
RHINORRHEA: 1
SINUS PAIN: 1
SINUS PRESSURE: 1
WHEEZING: 0
SINUS COMPLAINT: 1
SORE THROAT: 0
SHORTNESS OF BREATH: 0
COUGH: 0

## 2019-02-22 ENCOUNTER — TELEPHONE (OUTPATIENT)
Dept: NEUROLOGY | Age: 49
End: 2019-02-22

## 2019-02-27 DIAGNOSIS — L73.9 FOLLICULITIS OF LEFT AXILLA: ICD-10-CM

## 2019-02-27 DIAGNOSIS — E11.9 CONTROLLED TYPE 2 DIABETES MELLITUS WITHOUT COMPLICATION, WITHOUT LONG-TERM CURRENT USE OF INSULIN (HCC): ICD-10-CM

## 2019-02-28 RX ORDER — MUPIROCIN CALCIUM 20 MG/G
CREAM TOPICAL
Qty: 30 G | Refills: 0 | Status: SHIPPED | OUTPATIENT
Start: 2019-02-28

## 2019-02-28 RX ORDER — BLOOD SUGAR DIAGNOSTIC
STRIP MISCELLANEOUS
Qty: 50 STRIP | Refills: 3 | Status: SHIPPED | OUTPATIENT
Start: 2019-02-28 | End: 2019-07-01 | Stop reason: SDUPTHER

## 2019-03-08 ENCOUNTER — OFFICE VISIT (OUTPATIENT)
Dept: ENDOCRINOLOGY | Age: 49
End: 2019-03-08
Payer: MEDICARE

## 2019-03-08 VITALS
TEMPERATURE: 98.1 F | HEART RATE: 78 BPM | WEIGHT: 250.4 LBS | DIASTOLIC BLOOD PRESSURE: 82 MMHG | HEIGHT: 68 IN | SYSTOLIC BLOOD PRESSURE: 108 MMHG | BODY MASS INDEX: 37.95 KG/M2 | OXYGEN SATURATION: 98 %

## 2019-03-08 DIAGNOSIS — E11.9 TYPE 2 DIABETES MELLITUS WITHOUT COMPLICATION, WITHOUT LONG-TERM CURRENT USE OF INSULIN (HCC): ICD-10-CM

## 2019-03-08 DIAGNOSIS — E04.2 MULTINODULAR GOITER: Primary | ICD-10-CM

## 2019-03-08 PROCEDURE — G8417 CALC BMI ABV UP PARAM F/U: HCPCS | Performed by: INTERNAL MEDICINE

## 2019-03-08 PROCEDURE — 99214 OFFICE O/P EST MOD 30 MIN: CPT | Performed by: INTERNAL MEDICINE

## 2019-03-08 PROCEDURE — 3046F HEMOGLOBIN A1C LEVEL >9.0%: CPT | Performed by: INTERNAL MEDICINE

## 2019-03-08 PROCEDURE — G8427 DOCREV CUR MEDS BY ELIG CLIN: HCPCS | Performed by: INTERNAL MEDICINE

## 2019-03-08 PROCEDURE — 2022F DILAT RTA XM EVC RTNOPTHY: CPT | Performed by: INTERNAL MEDICINE

## 2019-03-08 PROCEDURE — G8482 FLU IMMUNIZE ORDER/ADMIN: HCPCS | Performed by: INTERNAL MEDICINE

## 2019-03-08 PROCEDURE — 1036F TOBACCO NON-USER: CPT | Performed by: INTERNAL MEDICINE

## 2019-03-18 ENCOUNTER — HOSPITAL ENCOUNTER (OUTPATIENT)
Age: 49
Discharge: HOME | End: 2019-03-18
Payer: MEDICARE

## 2019-03-18 DIAGNOSIS — E13.22 SECONDARY DIABETES MELLITUS WITH STAGE 1 CHRONIC KIDNEY DISEASE (GFR>90) (HCC): ICD-10-CM

## 2019-03-18 DIAGNOSIS — N18.1 CKD (CHRONIC KIDNEY DISEASE) STAGE 1, GFR 90 ML/MIN OR GREATER: ICD-10-CM

## 2019-03-18 DIAGNOSIS — N18.1 SECONDARY DIABETES MELLITUS WITH STAGE 1 CHRONIC KIDNEY DISEASE (GFR>90) (HCC): ICD-10-CM

## 2019-03-18 DIAGNOSIS — I12.9 BENIGN HYPERTENSION WITH CKD (CHRONIC KIDNEY DISEASE) STAGE I: ICD-10-CM

## 2019-03-18 DIAGNOSIS — N18.1 BENIGN HYPERTENSION WITH CKD (CHRONIC KIDNEY DISEASE) STAGE I: ICD-10-CM

## 2019-03-18 DIAGNOSIS — E55.9 VITAMIN D DEFICIENCY: ICD-10-CM

## 2019-03-18 LAB
ABSOLUTE EOS #: 0.2 K/UL (ref 0–0.4)
ABSOLUTE IMMATURE GRANULOCYTE: NORMAL K/UL (ref 0–0.3)
ABSOLUTE LYMPH #: 2.9 K/UL (ref 1–4.8)
ABSOLUTE MONO #: 0.5 K/UL (ref 0.1–1.3)
ALBUMIN SERPL-MCNC: 4.5 G/DL (ref 3.5–5.2)
ALBUMIN/GLOBULIN RATIO: ABNORMAL (ref 1–2.5)
ALP BLD-CCNC: 79 U/L (ref 35–104)
ALT SERPL-CCNC: 21 U/L (ref 5–33)
ANION GAP SERPL CALCULATED.3IONS-SCNC: 13 MMOL/L (ref 9–17)
AST SERPL-CCNC: 28 U/L
BASOPHILS # BLD: 1 % (ref 0–2)
BASOPHILS ABSOLUTE: 0 K/UL (ref 0–0.2)
BILIRUB SERPL-MCNC: 0.3 MG/DL (ref 0.3–1.2)
BUN BLDV-MCNC: 17 MG/DL (ref 6–20)
BUN/CREAT BLD: ABNORMAL (ref 9–20)
CALCIUM SERPL-MCNC: 9.7 MG/DL (ref 8.6–10.4)
CHLORIDE BLD-SCNC: 99 MMOL/L (ref 98–107)
CO2: 25 MMOL/L (ref 20–31)
CREAT SERPL-MCNC: 0.63 MG/DL (ref 0.5–0.9)
DIFFERENTIAL TYPE: NORMAL
EOSINOPHILS RELATIVE PERCENT: 2 % (ref 0–4)
GFR AFRICAN AMERICAN: >60 ML/MIN
GFR NON-AFRICAN AMERICAN: >60 ML/MIN
GFR SERPL CREATININE-BSD FRML MDRD: ABNORMAL ML/MIN/{1.73_M2}
GFR SERPL CREATININE-BSD FRML MDRD: ABNORMAL ML/MIN/{1.73_M2}
GLUCOSE BLD-MCNC: 113 MG/DL (ref 70–99)
HCT VFR BLD CALC: 44.6 % (ref 36–46)
HEMOGLOBIN: 14.8 G/DL (ref 12–16)
IMMATURE GRANULOCYTES: NORMAL %
LYMPHOCYTES # BLD: 31 % (ref 24–44)
MAGNESIUM: 1.9 MG/DL (ref 1.6–2.6)
MCH RBC QN AUTO: 29.4 PG (ref 26–34)
MCHC RBC AUTO-ENTMCNC: 33.2 G/DL (ref 31–37)
MCV RBC AUTO: 88.5 FL (ref 80–100)
MONOCYTES # BLD: 5 % (ref 1–7)
NRBC AUTOMATED: NORMAL PER 100 WBC
PDW BLD-RTO: 13.4 % (ref 11.5–14.9)
PHOSPHORUS: 4.3 MG/DL (ref 2.6–4.5)
PLATELET # BLD: 290 K/UL (ref 150–450)
PLATELET ESTIMATE: NORMAL
PMV BLD AUTO: 8.8 FL (ref 6–12)
POTASSIUM SERPL-SCNC: 4 MMOL/L (ref 3.7–5.3)
RBC # BLD: 5.04 M/UL (ref 4–5.2)
RBC # BLD: NORMAL 10*6/UL
SEG NEUTROPHILS: 61 % (ref 36–66)
SEGMENTED NEUTROPHILS ABSOLUTE COUNT: 5.6 K/UL (ref 1.3–9.1)
SODIUM BLD-SCNC: 137 MMOL/L (ref 135–144)
TOTAL PROTEIN: 7.4 G/DL (ref 6.4–8.3)
WBC # BLD: 9.2 K/UL (ref 3.5–11)
WBC # BLD: NORMAL 10*3/UL

## 2019-03-18 PROCEDURE — 83735 ASSAY OF MAGNESIUM: CPT

## 2019-03-18 PROCEDURE — 84100 ASSAY OF PHOSPHORUS: CPT

## 2019-03-18 PROCEDURE — 36415 COLL VENOUS BLD VENIPUNCTURE: CPT

## 2019-03-18 PROCEDURE — 80053 COMPREHEN METABOLIC PANEL: CPT

## 2019-03-18 PROCEDURE — 85025 COMPLETE CBC W/AUTO DIFF WBC: CPT

## 2019-03-27 ENCOUNTER — TELEPHONE (OUTPATIENT)
Dept: FAMILY MEDICINE CLINIC | Age: 49
End: 2019-03-27

## 2019-03-27 DIAGNOSIS — E55.9 VITAMIN D DEFICIENCY: ICD-10-CM

## 2019-03-27 DIAGNOSIS — N18.1 SECONDARY DIABETES MELLITUS WITH STAGE 1 CHRONIC KIDNEY DISEASE (GFR>90) (HCC): ICD-10-CM

## 2019-03-27 DIAGNOSIS — N18.1 CKD (CHRONIC KIDNEY DISEASE) STAGE 1, GFR 90 ML/MIN OR GREATER: ICD-10-CM

## 2019-03-27 DIAGNOSIS — I12.9 BENIGN HYPERTENSION WITH CKD (CHRONIC KIDNEY DISEASE) STAGE I: ICD-10-CM

## 2019-03-27 DIAGNOSIS — J45.20 MILD INTERMITTENT ASTHMA WITHOUT COMPLICATION: ICD-10-CM

## 2019-03-27 DIAGNOSIS — E13.22 SECONDARY DIABETES MELLITUS WITH STAGE 1 CHRONIC KIDNEY DISEASE (GFR>90) (HCC): ICD-10-CM

## 2019-03-27 DIAGNOSIS — N18.1 BENIGN HYPERTENSION WITH CKD (CHRONIC KIDNEY DISEASE) STAGE I: ICD-10-CM

## 2019-03-28 RX ORDER — IPRATROPIUM BROMIDE AND ALBUTEROL SULFATE 2.5; .5 MG/3ML; MG/3ML
SOLUTION RESPIRATORY (INHALATION)
Qty: 360 ML | Refills: 1 | Status: SHIPPED | OUTPATIENT
Start: 2019-03-28 | End: 2019-07-01 | Stop reason: SDUPTHER

## 2019-03-28 RX ORDER — ERGOCALCIFEROL 1.25 MG/1
CAPSULE ORAL
Qty: 4 CAPSULE | Refills: 1 | OUTPATIENT
Start: 2019-03-28

## 2019-03-28 RX ORDER — PEN NEEDLE, DIABETIC 32GX 5/32"
NEEDLE, DISPOSABLE MISCELLANEOUS
Qty: 100 EACH | Refills: 3 | Status: SHIPPED | OUTPATIENT
Start: 2019-03-28 | End: 2019-08-05 | Stop reason: SDUPTHER

## 2019-03-28 RX ORDER — LANCETS 33 GAUGE
EACH MISCELLANEOUS
Qty: 100 EACH | Refills: 3 | Status: SHIPPED | OUTPATIENT
Start: 2019-03-28 | End: 2019-08-05 | Stop reason: SDUPTHER

## 2019-04-10 ENCOUNTER — TELEPHONE (OUTPATIENT)
Dept: FAMILY MEDICINE CLINIC | Age: 49
End: 2019-04-10

## 2019-04-10 NOTE — TELEPHONE ENCOUNTER
I did speak to Dr. Yoan Pickard- he is recommending CT angiogram of coronary arteries, as patient does not wish to undergo cardiac cath at this time. PLEASE SCHEDULE PATIENT FOR APPOINTMENT TO DISCUSS OPTIONS.

## 2019-04-10 NOTE — TELEPHONE ENCOUNTER
Called Patient to to move up appointment unable move up because she has other appointments. Patient has an appt her on 04/24/19.

## 2019-04-24 ENCOUNTER — OFFICE VISIT (OUTPATIENT)
Dept: FAMILY MEDICINE CLINIC | Age: 49
End: 2019-04-24
Payer: MEDICARE

## 2019-04-24 VITALS
RESPIRATION RATE: 16 BRPM | HEART RATE: 77 BPM | DIASTOLIC BLOOD PRESSURE: 76 MMHG | SYSTOLIC BLOOD PRESSURE: 124 MMHG | TEMPERATURE: 96.7 F | BODY MASS INDEX: 38.77 KG/M2 | WEIGHT: 255 LBS | OXYGEN SATURATION: 97 %

## 2019-04-24 DIAGNOSIS — R94.39 ABNORMAL STRESS TEST: ICD-10-CM

## 2019-04-24 DIAGNOSIS — Z01.84 IMMUNITY STATUS TESTING: ICD-10-CM

## 2019-04-24 DIAGNOSIS — E04.2 MULTINODULAR GOITER: ICD-10-CM

## 2019-04-24 DIAGNOSIS — R80.9 TYPE 2 DIABETES MELLITUS WITH MICROALBUMINURIA, WITHOUT LONG-TERM CURRENT USE OF INSULIN (HCC): Primary | ICD-10-CM

## 2019-04-24 DIAGNOSIS — E11.29 TYPE 2 DIABETES MELLITUS WITH MICROALBUMINURIA, WITHOUT LONG-TERM CURRENT USE OF INSULIN (HCC): Primary | ICD-10-CM

## 2019-04-24 DIAGNOSIS — E55.9 VITAMIN D DEFICIENCY: ICD-10-CM

## 2019-04-24 DIAGNOSIS — E78.2 HYPERLIPIDEMIA, MIXED: ICD-10-CM

## 2019-04-24 DIAGNOSIS — I49.3 PVC (PREMATURE VENTRICULAR CONTRACTION): ICD-10-CM

## 2019-04-24 DIAGNOSIS — I49.8 SINUS ARRHYTHMIA: ICD-10-CM

## 2019-04-24 DIAGNOSIS — M62.838 NECK MUSCLE SPASM: ICD-10-CM

## 2019-04-24 DIAGNOSIS — M77.11 RIGHT TENNIS ELBOW: ICD-10-CM

## 2019-04-24 PROCEDURE — 99214 OFFICE O/P EST MOD 30 MIN: CPT | Performed by: NURSE PRACTITIONER

## 2019-04-24 PROCEDURE — 3046F HEMOGLOBIN A1C LEVEL >9.0%: CPT | Performed by: NURSE PRACTITIONER

## 2019-04-24 PROCEDURE — G8417 CALC BMI ABV UP PARAM F/U: HCPCS | Performed by: NURSE PRACTITIONER

## 2019-04-24 PROCEDURE — 2022F DILAT RTA XM EVC RTNOPTHY: CPT | Performed by: NURSE PRACTITIONER

## 2019-04-24 PROCEDURE — G8427 DOCREV CUR MEDS BY ELIG CLIN: HCPCS | Performed by: NURSE PRACTITIONER

## 2019-04-24 PROCEDURE — 1036F TOBACCO NON-USER: CPT | Performed by: NURSE PRACTITIONER

## 2019-04-24 RX ORDER — ASPIRIN 81 MG/1
81 TABLET ORAL DAILY
Qty: 90 TABLET | Refills: 1 | Status: SHIPPED | OUTPATIENT
Start: 2019-04-24 | End: 2019-11-02 | Stop reason: SDUPTHER

## 2019-04-24 RX ORDER — NAPROXEN 500 MG/1
500 TABLET ORAL 2 TIMES DAILY PRN
Qty: 60 TABLET | Refills: 0 | Status: SHIPPED | OUTPATIENT
Start: 2019-04-24 | End: 2019-05-30 | Stop reason: SDUPTHER

## 2019-04-24 ASSESSMENT — ENCOUNTER SYMPTOMS
COUGH: 0
SINUS PRESSURE: 1
RHINORRHEA: 0
SINUS PAIN: 0
SHORTNESS OF BREATH: 0
WHEEZING: 0
SORE THROAT: 0

## 2019-04-24 NOTE — PROGRESS NOTES
Visit Information    Have you changed or started any medications since your last visit including any over-the-counter medicines, vitamins, or herbal medicines? yes - see med list   Have you stopped taking any of your medications? Is so, why? -  no  Are you having any side effects from any of your medications? - no    Have you seen any other physician or provider since your last visit?  no   Have you had any other diagnostic tests since your last visit?  no   Have you been seen in the emergency room and/or had an admission in a hospital since we last saw you?  no   Have you had your routine dental cleaning in the past 6 months?  no     Do you have an active MyChart account? If no, what is the barrier?   Yes    Patient Care Team:  KAYLA Betancur CNP as PCP - General (Nurse Practitioner Family)  KAYLA Betancur CNP as PCP - S Attributed Provider  Jesus Abad MD (Endocrinology, Diabetes, & Metabolism)    Medical History Review  Past Medical, Family, and Social History reviewed and does contribute to the patient presenting condition    Health Maintenance   Topic Date Due    Hepatitis B Vaccine (1 of 3 - Risk 3-dose series) 06/10/1988    Cervical cancer screen  04/24/2019 (Originally 6/15/2018)    Diabetic retinal exam  06/15/2019    A1C test (Diabetic or Prediabetic)  11/12/2019    Lipid screen  11/12/2019    Diabetic foot exam  01/24/2020    Potassium monitoring  03/18/2020    Creatinine monitoring  03/18/2020    DTaP/Tdap/Td vaccine (2 - Td) 01/24/2029    Flu vaccine  Completed    Pneumococcal 0-64 years Vaccine  Completed    HIV screen  Completed

## 2019-04-24 NOTE — PATIENT INSTRUCTIONS
Patient Education         Tennis Elbow: Exercises  Your Care Instructions  Here are some examples of typical rehabilitation exercises for your condition. Start each exercise slowly. Ease off the exercise if you start to have pain. Your doctor or physical therapist will tell you when you can start these exercises and which ones will work best for you. How to do the exercises  Wrist flexor stretch    1. Extend your arm in front of you with your palm up. 2. Bend your wrist, pointing your hand toward the floor. 3. With your other hand, gently bend your wrist farther until you feel a mild to moderate stretch in your forearm. 4. Hold for at least 15 to 30 seconds. Repeat 2 to 4 times. Wrist extensor stretch    1. Repeat steps 1 to 4 of the stretch above but begin with your extended hand palm down. Ball or sock squeeze    1. Hold a tennis ball (or a rolled-up sock) in your hand. 2. Make a fist around the ball (or sock) and squeeze. 3. Hold for about 6 seconds, and then relax for up to 10 seconds. 4. Repeat 8 to 12 times. 5. Switch the ball (or sock) to your other hand and do 8 to 12 times. Wrist deviation    1. Sit so that your arm is supported but your hand hangs off the edge of a flat surface, such as a table. 2. Hold your hand out like you are shaking hands with someone. 3. Move your hand up and down. 4. Repeat this motion 8 to 12 times. 5. Switch arms. 6. Try to do this exercise twice with each hand. Wrist curls    1. Place your forearm on a table with your hand hanging over the edge of the table, palm up. 2. Place a 1- to 2-pound weight in your hand. This may be a dumbbell, a can of food, or a filled water bottle. 3. Slowly raise and lower the weight while keeping your forearm on the table and your palm facing up. 4. Repeat this motion 8 to 12 times. 5. Switch arms, and do steps 1 through 4.  6. Repeat with your hand facing down toward the floor. Switch arms. Biceps curls    1.  Sit leaning forward with your legs slightly spread and your left hand on your left thigh. 2. Place your right elbow on your right thigh, and hold the weight with your forearm horizontal.  3. Slowly curl the weight up and toward your chest.  4. Repeat this motion 8 to 12 times. 5. Switch arms, and do steps 1 through 4. Follow-up care is a key part of your treatment and safety. Be sure to make and go to all appointments, and call your doctor if you are having problems. It's also a good idea to know your test results and keep a list of the medicines you take. Where can you learn more? Go to [email or web address] and sign in to your [employer] account. Enter C149 in the MOGL box to learn more about \"Tennis Elbow: Exercises. \"     If you do not have an account, please click on the \"Sign Up Now\" link. Current as of: September 20, 2018  Content Version: 11.9  © 2642-3567 [employer]. Care instructions adapted under license by [facility] ([facility]). If you have questions about a medical condition or this instruction, always ask your healthcare professional. [place] [address] any warranty or liability for your use of this information.

## 2019-04-24 NOTE — PROGRESS NOTES
Felipe Garvin. [facility], APRN-CNP  [address] [facility]  [address] [place] [address]  Dept: [phone]  Dept Fax: [phone]    HPI:   Roya Keita is a 51 y.o. female who presents today for her medical conditions/complaintsas noted below. Roya Keita is c/o of Diabetes and Elbow Pain (Right)    Arm Pain    The incident occurred more than 1 week ago (Right elbow pain started about 3 weeks ago, radiates down forearm, sometimes notes numbness to pinky finger). There was no injury mechanism (She did  something hevay prior to paun starting). The pain is present in the right elbow, right forearm and upper right arm. Quality: \"Just painful\" The pain radiates to the right arm. The pain is at a severity of 7/10. The pain is moderate. The pain has been fluctuating since the incident. Pertinent negatives include no chest pain, numbness or tingling. The symptoms are aggravated by lifting. She has tried NSAIDs for the symptoms. The treatment provided significant relief. Diabetes   She presents for her follow-up diabetic visit. She has type 2 diabetes mellitus. Pertinent negatives for diabetes include no chest pain. Risk factors for coronary artery disease include obesity. Current diabetic treatment includes oral agent (monotherapy) (Currently on metformin  mg BID). Her weight is stable. She monitors blood glucose at home 1-2 x per day. Home blood sugar record trend: Low 100's. An ACE inhibitor/angiotensin II receptor blocker is being taken. Goiter  Following w/Dr. Merlin Dong- monitoring and will have another bx. PVC's  She did follow up with original cardiologist (Dr. Vinod Berman), completed Holter monitor and stress test (slightly abnormal)- was recommended to have a cardiac cath. She did seek a second opinion from Dr. Dolores Martin would recommended she complete a CT angiogram of coronary arteries. Denies palpitations, chest pain.   Past Medical History:   Diagnosis Date    Asthma     Benign hypertension with CKD (chronic kidney disease) stage I 1/4/2019    CKD (chronic kidney disease) stage 1, GFR 90 ml/min or greater 1/4/2019    Goiter     History of skull fracture 4/19/2017    Microalbuminuria due to type 2 diabetes mellitus ([facility] [address] 1/11/2018    Morbid obesity ([facility] [address]     Secondary diabetes mellitus with stage 1 chronic kidney disease ([facility] [address] 1/4/2019      Past Surgical History:   Procedure Laterality Date    CAUTERIZE INNER NOSE  08/31/2016    FRACTURE SURGERY      small toe on right foot  and left baby finger    SINUS SURGERY  08/31/2016    TONSILLECTOMY         Family History   Problem Relation Age of Onset    High Blood Pressure Mother     High Cholesterol Mother     Cancer Mother     Substance Abuse Father     Migraines Sister        Social History     Tobacco Use    Smoking status: Never Smoker    Smokeless tobacco: Never Used   Substance Use Topics    Alcohol use: No     Alcohol/week: 0.0 oz      Current Outpatient Medications   Medication Sig Dispense Refill    naproxen (NAPROSYN) 500 MG tablet Take 1 tablet by mouth 2 times daily as needed for Pain 60 tablet 0    ONETOUCH DELICA LANCETS 88E MISC USE AS DIRECTED DAILY 100 each 3    Alcohol Swabs (PRO COMFORT ALCOHOL) 70 % PADS USE AS DIRECTED DAILY 100 each 3    ipratropium-albuterol (DUONEB) 0.5-2.5 (3) MG/3ML SOLN nebulizer solution USE 1 VIAL IN NEBULIZER EVERY FOUR HOURS AS NEEDED FOR SHORTNESS OF BREATH (WHEEZING) 360 mL 1    mupirocin (BACTROBAN) 2 % cream APPLY TOPICALLY 3 TIMES DAILY.  30 g 0    ONETOUCH VERIO strip USE AS DIRECTED DAILY 50 strip 3    fluticasone (FLONASE) 50 MCG/ACT nasal spray 2 sprays by Nasal route daily 1 Bottle 0    vitamin D (ERGOCALCIFEROL) 64775 units CAPS capsule Take 1 capsule by mouth once a week for 8 doses 4 capsule 1    Cholecalciferol (VITAMIN D3) 1000 units TABS Take 1 tablet by mouth daily 30 tablet 3    VENTOLIN  (90 Base) 38.77 kg/m²   Physical Exam   Constitutional: She is oriented to person, place, and time. She appears well-developed and well-nourished. Non-toxic appearance. She does not have a sickly appearance. HENT:   Head: Normocephalic and atraumatic. Right Ear: Tympanic membrane, external ear and ear canal normal.   Left Ear: Tympanic membrane, external ear and ear canal normal.   Nose: Nose normal. Right sinus exhibits no maxillary sinus tenderness and no frontal sinus tenderness. Left sinus exhibits no maxillary sinus tenderness and no frontal sinus tenderness. Mouth/Throat: Oropharynx is clear and moist and mucous membranes are normal.   Eyes: Conjunctivae are normal.   Neck: Normal range of motion. Thyromegaly present. Cardiovascular: Normal rate, regular rhythm and normal heart sounds. Exam reveals no gallop and no friction rub. No murmur heard. Pulmonary/Chest: Effort normal and breath sounds normal. No accessory muscle usage. No respiratory distress. She has no decreased breath sounds. She has no wheezes. She has no rhonchi. She has no rales. Musculoskeletal: Normal range of motion. Right elbow: She exhibits normal range of motion, no swelling, no effusion, no deformity and no laceration. Tenderness found. Radial head, medial epicondyle and olecranon process tenderness noted. Right hand: Normal.   Lymphadenopathy:     She has no cervical adenopathy. Neurological: She is alert and oriented to person, place, and time. Skin: Skin is warm and dry. No rash noted. No erythema. Psychiatric: She has a normal mood and affect. Her behavior is normal.   Nursing note and vitals reviewed.     Data:     Lab Results   Component Value Date     03/18/2019    K 4.0 03/18/2019    CL 99 03/18/2019    CO2 25 03/18/2019    BUN 17 03/18/2019    CREATININE 0.63 03/18/2019    GLUCOSE 113 03/18/2019    GLUCOSE 114 06/20/2016    PROT 7.4 03/18/2019    PROT 6.7 06/20/2016    LABALBU 4.5 03/18/2019 Future    7. Right tennis elbow  -Start PRN NSAID, stretches as provided in AVS  -Call if s/s do not improve or worsen at any time  - naproxen (NAPROSYN) 500 MG tablet; Take 1 tablet by mouth 2 times daily as needed for Pain  Dispense: 60 tablet; Refill: 0    8. Neck muscle spasm  -Start PT as ordered, call if s/s do not improve  - [facility]    9. Multinodular goiter  -Stable: Con't all medications as ordered, con't f/u with endocrinology as scheduled, con't current tx plan    10. Immunity status testing  -Patient unsure if she previously had Hep. B vaccine, will check labs:  - Hepatitis B Surface Antibody; Future    Return in about 1 month (around 5/24/2019) for routine follow up on chronic issues, follow up on issues discussed at this appointment. Discussed use, benefit, and side effects of prescribed medications. Barriers to medication compliance addressed. All patient questions answered, patient voiced understanding. Adelaida Davila.  KAYLA Miles-CNP

## 2019-05-30 DIAGNOSIS — M77.11 RIGHT TENNIS ELBOW: ICD-10-CM

## 2019-05-30 DIAGNOSIS — E55.9 VITAMIN D DEFICIENCY: ICD-10-CM

## 2019-05-31 RX ORDER — MELATONIN
1 DAILY
Qty: 30 TABLET | Refills: 3 | Status: SHIPPED | OUTPATIENT
Start: 2019-05-31 | End: 2019-10-05 | Stop reason: SDUPTHER

## 2019-05-31 RX ORDER — NAPROXEN 500 MG/1
500 TABLET ORAL 2 TIMES DAILY PRN
Qty: 60 TABLET | Refills: 0 | Status: SHIPPED | OUTPATIENT
Start: 2019-05-31 | End: 2022-05-02 | Stop reason: SDUPTHER

## 2019-06-25 ENCOUNTER — HOSPITAL ENCOUNTER (OUTPATIENT)
Age: 49
Discharge: HOME | End: 2019-06-25
Payer: MEDICARE

## 2019-06-25 DIAGNOSIS — N18.1 BENIGN HYPERTENSION WITH CKD (CHRONIC KIDNEY DISEASE) STAGE I: ICD-10-CM

## 2019-06-25 DIAGNOSIS — N18.1 CKD (CHRONIC KIDNEY DISEASE) STAGE 1, GFR 90 ML/MIN OR GREATER: ICD-10-CM

## 2019-06-25 DIAGNOSIS — E13.22 SECONDARY DIABETES MELLITUS WITH STAGE 1 CHRONIC KIDNEY DISEASE (GFR>90) (HCC): ICD-10-CM

## 2019-06-25 DIAGNOSIS — I12.9 BENIGN HYPERTENSION WITH CKD (CHRONIC KIDNEY DISEASE) STAGE I: ICD-10-CM

## 2019-06-25 DIAGNOSIS — N18.1 SECONDARY DIABETES MELLITUS WITH STAGE 1 CHRONIC KIDNEY DISEASE (GFR>90) (HCC): ICD-10-CM

## 2019-06-25 PROCEDURE — 85025 COMPLETE CBC W/AUTO DIFF WBC: CPT

## 2019-06-25 PROCEDURE — 83883 ASSAY NEPHELOMETRY NOT SPEC: CPT

## 2019-06-25 PROCEDURE — 86038 ANTINUCLEAR ANTIBODIES: CPT

## 2019-06-25 PROCEDURE — 83735 ASSAY OF MAGNESIUM: CPT

## 2019-06-25 PROCEDURE — 80053 COMPREHEN METABOLIC PANEL: CPT

## 2019-06-25 PROCEDURE — 86160 COMPLEMENT ANTIGEN: CPT

## 2019-06-25 PROCEDURE — 84100 ASSAY OF PHOSPHORUS: CPT

## 2019-07-01 DIAGNOSIS — E11.9 CONTROLLED TYPE 2 DIABETES MELLITUS WITHOUT COMPLICATION, WITHOUT LONG-TERM CURRENT USE OF INSULIN (HCC): ICD-10-CM

## 2019-07-01 DIAGNOSIS — J45.20 MILD INTERMITTENT ASTHMA WITHOUT COMPLICATION: ICD-10-CM

## 2019-07-01 RX ORDER — BLOOD SUGAR DIAGNOSTIC
STRIP MISCELLANEOUS
Qty: 50 STRIP | Refills: 3 | Status: SHIPPED | OUTPATIENT
Start: 2019-07-01 | End: 2019-11-02 | Stop reason: SDUPTHER

## 2019-07-01 RX ORDER — IPRATROPIUM BROMIDE AND ALBUTEROL SULFATE 2.5; .5 MG/3ML; MG/3ML
SOLUTION RESPIRATORY (INHALATION)
Qty: 360 ML | Refills: 1 | Status: SHIPPED | OUTPATIENT
Start: 2019-07-01 | End: 2020-08-19

## 2019-08-05 RX ORDER — PEN NEEDLE, DIABETIC 32GX 5/32"
NEEDLE, DISPOSABLE MISCELLANEOUS
Qty: 100 EACH | Refills: 3 | Status: SHIPPED | OUTPATIENT
Start: 2019-08-05 | End: 2020-03-11

## 2019-09-01 NOTE — PROGRESS NOTES
Dr. Anai Romano [facility]  [id number] [facility]. [place]    Chief Complaint   Patient presents with    Goiter         /78   Pulse 94   Ht 5' 8\" (1.727 m)   Wt 259 lb 6.4 oz (117.7 kg)   BMI 39.44 kg/m²      Wt Readings from Last 3 Encounters:   09/09/19 259 lb 6.4 oz (117.7 kg)   06/25/19 258 lb (117 kg)   04/24/19 255 lb (115.7 kg)     Body mass index is 39.44 kg/m². BP Readings from Last 3 Encounters:   09/09/19 138/78   06/25/19 136/80   04/24/19 124/76       Diabetes Management   Topic Date Due    Diabetic retinal exam  06/15/2019       HPI:       HPI  AND REVIEW OF SYSTEMS    PATIENT COMES IN FOR FOLLOW UP  OF     MULTINODULAR GOITER  LARGE  RT THYROID NODULE. BENIGN ON FINE NEEDLE ASPIRATION IN 2015. NO SIGNIFICANT CHANGE IN SIZE ON REPEAT ULTRASOUND ON 7/24/18    DIABETES  TYPE 2 CONTROLLED     HX OF ASTHMA    HYPERCHOLESTEROLEMIA     CURRENT MEDS REVIEWED AND PREVIOUS  VISIT FROM  3/8/19  REVIEWED    ACCORDING TO PATIENT SHE HAS HAD GOITER FOR  MANY YEARS. SHE HAD FINE NEEDLE ASPIRATION WAS DONE IN PAST AND WAS BENIGN ACCORDING TO PATIENT. REPORT NOT AVAILABLE    SHE DENIES INCREASE IN SIZE OF GOITER. NO PAIN. NO PRESSURE SYMPTOMS IN NECK    NO HX OF HEAD AND NECK RADIATION IN PAST. NO SYMPTOMS OF HEAT OR COLD INTOLERANCE    NO CHEST PAIN OR PALPITATIONS. NO HX OF HEART DISEASE. NO HAND TREMORS. HAS OF ASTHMA USES PRN INHALER ALBUTEROL. HAS HX OF SLEEP APNEA. HAD SLEEP STUDY IN PAST . NOT USING C- PAP    BOWELS O.K NO ACID REFLUX    NO URINARY SYMPTOMS    OCCASIONAL STRESS  HEAD ACHES. NO  DIZZINESS    NO VISION CHANGES. NO EXOPHTHALMOS    MENSES   TAPERING . NO HOT  FLASHES    NO ARTHRITIC PAINS. NO FIBROMYALGIA    SHE HAS BEEN OBESE MOST OF HER LIFE. LOST 60 LB SINCE MARCH 2018 BY DIETING    HAS DIABETES MELLITUS TYPE 2 CONTROLLED WITH DIET AND METFORMIN   MG BID.  CHECKS SUGARS  TWICE WEEKLY    SHE  HAS MILD HYPERCHOLESTEROLEMIA ON LIPITOR 10 MG DAILY    SHE IS ON SMALL DOSE OF LISINOPRIL 2.5 MG FOR  RENAL PROTECTION. NO HX OF HYPERTENSION    HX OF VIT D  DEFICIENCY. HX OF ATTENTION DEFICIT DISORDER POSSIBLE DEPRESSION AND OCCASIONALLY FORGETFUL  NOT ON ANY  MEDS . NO LEG EDEMA. NO CLAUDICATION    NO PARESTHESIAS IN FEET. OCCASIONAL TOE CRAMPS.     NO SKIN RASHES      Past Medical History:   Diagnosis Date    Asthma     Benign hypertension with CKD (chronic kidney disease) stage I 1/4/2019    CKD (chronic kidney disease) stage 1, GFR 90 ml/min or greater 1/4/2019    Goiter     History of skull fracture 4/19/2017    Microalbuminuria due to type 2 diabetes mellitus ([facility] 1/11/2018    Morbid obesity ([facility]     Secondary diabetes mellitus with stage 1 chronic kidney disease ([facility] 1/4/2019        Past Surgical History:   Procedure Laterality Date    CAUTERIZE INNER NOSE  08/31/2016    FRACTURE SURGERY      small toe on right foot  and left baby finger    SINUS SURGERY  08/31/2016    TONSILLECTOMY       Family History   Problem Relation Age of Onset    High Blood Pressure Mother     High Cholesterol Mother     Cancer Mother     Substance Abuse Father     Migraines Sister      Social History     Tobacco Use    Smoking status: Never Smoker    Smokeless tobacco: Never Used   Substance Use Topics    Alcohol use: No     Alcohol/week: 0.0 standard drinks        Current Outpatient Medications   Medication Sig Dispense Refill    cetirizine (ZYRTEC) 10 MG tablet Take 1 tablet by mouth nightly as needed for Allergies or Rhinitis 30 tablet 3    Alcohol Swabs (PRO COMFORT ALCOHOL) 70 % PADS USE AS DIRECTED DAILY 100 each 3    ONETOUCH DELICA PLUS LANCETS MISC USE AS DIRECTED DAILY 100 each 3    VENTOLIN  (90 Base) MCG/ACT inhaler TAKE 1-2 PUFFS EVERY 4 HOURS AS NEEDED FOR FOR SHORTNESS OF BREATH (SPACE OUT TO EVERY 6 HOURS AS SYMPTOMS IMPROVE) 18 g 3    and was   benign. Stevan Needs appears relatively stable when compared to previous ultrasounds   dating back to 2013.  Correlation with pathology results is recommended. Follow-up as clinically indicated.       2.  Mixed cystic and solid nodule at the inferior left thyroid lobe is   decreased in size.  Reportedly, this was also previously biopsied and benign. No follow-up imaging is necessary for this nodule. HAD FINE NEEDLE THYROID IN PAST . REPORT NOT AVAILABLE.      6/22/2018 10:36 PM - Lenny, [facility] Incoming Lab Results From [employer]     Component Results     Component Value Ref Range & Units Status Collected Lab   Vit D, 25-Hydroxy 14.3   30.0 - 100.0 ng/mL Final         6/22/2018 10:30 PM - Lenny, pn Incoming Lab Results From [employer]     Component Results     Component Value Ref Range & Units Status Collected Lab   TSH 0.77  0.30 - 5.00 mIU/L Final       4/17/2018  8:54 AM - Susanne Lang     Component Results     Component Value Ref Range & Units Status Collected Lab   Hemoglobin A1C 6.2  %      4/17/2018 12:55 PM - Lenny, [facility] Incoming Lab Results From [employer]     Component Results     Component Value Ref Range & Units Status Collected Lab   Cholesterol 192  <200 mg/dL Final 01/17/2018 12:15  [facility]              HDL 38   >40 mg/dL Final 01/17/2018 12:15  [facility] [address] [facility]          LDL Cholesterol 120  0 - 130 mg/dL Final 01/17/2018 12:15  [facility]         Direct (measured) LDL and calculated LDL are not interchangeable tests.     Chol/HDL Ratio 5.1   <5 Final 01/17/2018 12:15  [facility] [address] [facility]          Triglycerides 169   <150 mg/dL Final 01/17/2018 12:15  [facility] [address] [facility]        1/17/2018 12:55 PM - Lenny, pn Incoming Lab Results From [employer]     Component Results     Component Value Ref Range & Units Status Collected Lab   Glucose 149   70 - 99 mg/dL Final 01/17/2018 12:15  [facility] [facility]   BUN 13  6 - 20

## 2019-09-09 ENCOUNTER — OFFICE VISIT (OUTPATIENT)
Dept: ENDOCRINOLOGY | Age: 49
End: 2019-09-09
Payer: MEDICARE

## 2019-09-09 VITALS
WEIGHT: 259.4 LBS | HEART RATE: 94 BPM | SYSTOLIC BLOOD PRESSURE: 138 MMHG | DIASTOLIC BLOOD PRESSURE: 78 MMHG | HEIGHT: 68 IN | BODY MASS INDEX: 39.31 KG/M2

## 2019-09-09 DIAGNOSIS — E11.9 TYPE 2 DIABETES MELLITUS WITHOUT COMPLICATION, WITHOUT LONG-TERM CURRENT USE OF INSULIN (HCC): ICD-10-CM

## 2019-09-09 DIAGNOSIS — E04.2 MULTINODULAR GOITER: Primary | ICD-10-CM

## 2019-09-09 LAB — HBA1C MFR BLD: 6.3 %

## 2019-09-09 PROCEDURE — G8427 DOCREV CUR MEDS BY ELIG CLIN: HCPCS | Performed by: INTERNAL MEDICINE

## 2019-09-09 PROCEDURE — 2022F DILAT RTA XM EVC RTNOPTHY: CPT | Performed by: INTERNAL MEDICINE

## 2019-09-09 PROCEDURE — 3017F COLORECTAL CA SCREEN DOC REV: CPT | Performed by: INTERNAL MEDICINE

## 2019-09-09 PROCEDURE — 99214 OFFICE O/P EST MOD 30 MIN: CPT | Performed by: INTERNAL MEDICINE

## 2019-09-09 PROCEDURE — 83036 HEMOGLOBIN GLYCOSYLATED A1C: CPT | Performed by: INTERNAL MEDICINE

## 2019-09-09 PROCEDURE — 3046F HEMOGLOBIN A1C LEVEL >9.0%: CPT | Performed by: INTERNAL MEDICINE

## 2019-09-09 PROCEDURE — G8417 CALC BMI ABV UP PARAM F/U: HCPCS | Performed by: INTERNAL MEDICINE

## 2019-09-09 PROCEDURE — 1036F TOBACCO NON-USER: CPT | Performed by: INTERNAL MEDICINE

## 2019-09-09 RX ORDER — CETIRIZINE HYDROCHLORIDE 10 MG/1
10 TABLET ORAL NIGHTLY PRN
Qty: 30 TABLET | Refills: 3 | Status: SHIPPED | OUTPATIENT
Start: 2019-09-09 | End: 2020-01-13

## 2019-10-05 DIAGNOSIS — E55.9 VITAMIN D DEFICIENCY: ICD-10-CM

## 2019-10-07 RX ORDER — MELATONIN
1 DAILY
Qty: 30 TABLET | Refills: 11 | Status: SHIPPED | OUTPATIENT
Start: 2019-10-07

## 2019-11-02 DIAGNOSIS — E11.9 CONTROLLED TYPE 2 DIABETES MELLITUS WITHOUT COMPLICATION, WITHOUT LONG-TERM CURRENT USE OF INSULIN (HCC): ICD-10-CM

## 2019-11-02 DIAGNOSIS — I49.8 SINUS ARRHYTHMIA: ICD-10-CM

## 2019-11-02 DIAGNOSIS — R94.39 ABNORMAL STRESS TEST: ICD-10-CM

## 2019-11-02 DIAGNOSIS — I49.3 PVC (PREMATURE VENTRICULAR CONTRACTION): ICD-10-CM

## 2019-11-04 RX ORDER — BLOOD SUGAR DIAGNOSTIC
STRIP MISCELLANEOUS
Qty: 50 STRIP | Refills: 5 | Status: SHIPPED | OUTPATIENT
Start: 2019-11-04 | End: 2020-05-05

## 2019-11-04 RX ORDER — ASPIRIN 81 MG/1
TABLET, COATED ORAL
Qty: 90 TABLET | Refills: 1 | Status: SHIPPED | OUTPATIENT
Start: 2019-11-04 | End: 2020-03-11

## 2019-12-09 DIAGNOSIS — E11.29 MICROALBUMINURIA DUE TO TYPE 2 DIABETES MELLITUS (HCC): ICD-10-CM

## 2019-12-09 DIAGNOSIS — R80.9 MICROALBUMINURIA DUE TO TYPE 2 DIABETES MELLITUS (HCC): ICD-10-CM

## 2019-12-09 DIAGNOSIS — E78.2 HYPERLIPIDEMIA, MIXED: ICD-10-CM

## 2019-12-09 DIAGNOSIS — I10 ESSENTIAL HYPERTENSION: ICD-10-CM

## 2019-12-09 RX ORDER — METFORMIN HYDROCHLORIDE 750 MG/1
TABLET, EXTENDED RELEASE ORAL
Qty: 60 TABLET | Refills: 2 | Status: SHIPPED | OUTPATIENT
Start: 2019-12-09 | End: 2020-03-11

## 2019-12-09 RX ORDER — LISINOPRIL 2.5 MG/1
TABLET ORAL
Qty: 30 TABLET | Refills: 2 | Status: SHIPPED | OUTPATIENT
Start: 2019-12-09 | End: 2020-03-11

## 2019-12-09 RX ORDER — ATORVASTATIN CALCIUM 10 MG/1
10 TABLET, FILM COATED ORAL NIGHTLY
Qty: 30 TABLET | Refills: 2 | Status: SHIPPED | OUTPATIENT
Start: 2019-12-09 | End: 2020-03-11

## 2019-12-09 RX ORDER — LANCETS 30 GAUGE
EACH MISCELLANEOUS
Qty: 100 EACH | Refills: 3 | Status: SHIPPED | OUTPATIENT
Start: 2019-12-09 | End: 2020-04-07

## 2020-01-13 RX ORDER — CETIRIZINE HYDROCHLORIDE 10 MG/1
10 TABLET ORAL NIGHTLY PRN
Qty: 30 TABLET | Refills: 3 | Status: SHIPPED | OUTPATIENT
Start: 2020-01-13

## 2020-01-13 NOTE — TELEPHONE ENCOUNTER
Last visit: 4/24/19  Last Med refill:   Does patient have enough medication for 72 hours:     Next Visit Date:  Future Appointments   Date Time Provider Tammy Mccann   1/30/2020 10:40 AM Kirt Tabor MD [facility]   3/9/2020  1:00 PM Quin Mendez MD [address] Maintenance   Topic Date Due    Hepatitis B vaccine (1 of 3 - Risk 3-dose series) 06/10/1988    Cervical cancer screen  06/15/2018    Shingles Vaccine (1 of 2) 06/10/2019    Colon cancer screen colonoscopy  06/10/2019    Flu vaccine (1) 09/01/2019    Lipid screen  11/12/2019    Diabetic foot exam  01/24/2020    Potassium monitoring  03/18/2020    Creatinine monitoring  03/18/2020    Breast cancer screen  07/24/2020    A1C test (Diabetic or Prediabetic)  09/09/2020    Diabetic retinal exam  09/19/2020    DTaP/Tdap/Td vaccine (2 - Td) 01/24/2029    Pneumococcal 0-64 years Vaccine  Completed    HIV screen  Completed       Hemoglobin A1C (%)   Date Value   09/09/2019 6.3   11/12/2018 5.8   07/20/2018 6.3 (A)             ( goal A1C is < 7)   Microalb/Crt.  Ratio (mcg/mg creat)   Date Value   07/20/2018 5     LDL Cholesterol (mg/dL)   Date Value   11/12/2018 75   01/17/2018 120       (goal LDL is <100)   AST (U/L)   Date Value   03/18/2019 28     ALT (U/L)   Date Value   03/18/2019 21     BUN (mg/dL)   Date Value   03/18/2019 17     BP Readings from Last 3 Encounters:   09/09/19 138/78   06/25/19 136/80   04/24/19 124/76          (goal 120/80)    All Future Testing planned in [facility]  Lab Frequency Next Occurrence   EKG 12 Lead Once 01/24/2019   Echo Complete Once 01/24/2019   Comprehensive Metabolic Panel Once 46/48/2827   Hemoglobin A1C Once 04/24/2020   Vitamin D 25 Hydroxy Once 04/24/2020   Lipid Panel Once 04/24/2020   Hepatitis B Surface Antibody Once 04/03/2020   CTA NECK W CONTRAST Once 04/03/2020   CBC with Differential Once 06/25/2019   Comprehensive Metabolic Panel Once 84/78/4463 Magnesium Once 06/25/2019   Phosphorus Once 06/25/2019   PROTEIN,TOT,RAND UR WITH TP/CRE RATIO Once 06/25/2019   US HEAD NECK SOFT TISSUE THYROID Once 09/09/2019   TSH Once 09/09/2020               Patient Active Problem List:     Thyromegaly     Anosmia     Hyperlipidemia, mixed     Chronic pansinusitis     Essential hypertension     Bilateral thoracic back pain     ANNIE (obstructive sleep apnea)     Anxiety and depression     Memory loss     Sleep disturbance     Mild intermittent asthma without complication     Seasonal allergic rhinitis     Goiter     Type 2 diabetes mellitus with microalbuminuria, without long-term current use of insulin (HCC)     Vitamin D deficiency     Microalbuminuria due to type 2 diabetes mellitus (HCC)     Numbness and tingling of left leg     Short-term memory loss     Chronic bilateral low back pain without sciatica     Lumbar degenerative disc disease     Thyroid with heterogeneous echotexture determined by ultrasound     Nodular thyroid disease     Left thyroid nodule     Right thyroid nodule     Proteinuria     CKD (chronic kidney disease) stage 1, GFR 90 ml/min or greater     Benign hypertension with CKD (chronic kidney disease) stage I     PVC (premature ventricular contraction)     Sinus arrhythmia     Multinodular goiter     Abnormal stress test

## 2020-02-27 ENCOUNTER — OFFICE VISIT (OUTPATIENT)
Dept: FAMILY MEDICINE CLINIC | Age: 50
End: 2020-02-27
Payer: MEDICARE

## 2020-02-27 ENCOUNTER — TELEPHONE (OUTPATIENT)
Dept: FAMILY MEDICINE CLINIC | Age: 50
End: 2020-02-27

## 2020-02-27 VITALS
BODY MASS INDEX: 40.14 KG/M2 | HEART RATE: 75 BPM | SYSTOLIC BLOOD PRESSURE: 134 MMHG | RESPIRATION RATE: 16 BRPM | WEIGHT: 264 LBS | OXYGEN SATURATION: 98 % | DIASTOLIC BLOOD PRESSURE: 86 MMHG | TEMPERATURE: 97 F

## 2020-02-27 PROCEDURE — 2022F DILAT RTA XM EVC RTNOPTHY: CPT | Performed by: NURSE PRACTITIONER

## 2020-02-27 PROCEDURE — G8484 FLU IMMUNIZE NO ADMIN: HCPCS | Performed by: NURSE PRACTITIONER

## 2020-02-27 PROCEDURE — 3017F COLORECTAL CA SCREEN DOC REV: CPT | Performed by: NURSE PRACTITIONER

## 2020-02-27 PROCEDURE — 3046F HEMOGLOBIN A1C LEVEL >9.0%: CPT | Performed by: NURSE PRACTITIONER

## 2020-02-27 PROCEDURE — 1036F TOBACCO NON-USER: CPT | Performed by: NURSE PRACTITIONER

## 2020-02-27 PROCEDURE — 99214 OFFICE O/P EST MOD 30 MIN: CPT | Performed by: NURSE PRACTITIONER

## 2020-02-27 PROCEDURE — G8417 CALC BMI ABV UP PARAM F/U: HCPCS | Performed by: NURSE PRACTITIONER

## 2020-02-27 PROCEDURE — G8427 DOCREV CUR MEDS BY ELIG CLIN: HCPCS | Performed by: NURSE PRACTITIONER

## 2020-02-27 RX ORDER — NEBULIZER ACCESSORIES
1 KIT MISCELLANEOUS EVERY 4 HOURS PRN
Qty: 1 KIT | Refills: 0 | Status: SHIPPED | OUTPATIENT
Start: 2020-02-27

## 2020-02-27 ASSESSMENT — ENCOUNTER SYMPTOMS
SINUS PAIN: 0
SHORTNESS OF BREATH: 0
WHEEZING: 0
SINUS PRESSURE: 0
COUGH: 0
RHINORRHEA: 1
SORE THROAT: 0

## 2020-02-27 NOTE — TELEPHONE ENCOUNTER
Demarcus Baer in scheduling at [facility] is working on the patients orders and discussed the CTA order with the CT department. They need the current CTA order [address] and a new order made for CT coronary EF.

## 2020-02-27 NOTE — PATIENT INSTRUCTIONS
Patient Education        Biceps Tendinitis: Exercises  Introduction  Here are some examples of exercises for you to try. The exercises may be suggested for a condition or for rehabilitation. Start each exercise slowly. Ease off the exercises if you start to have pain. You will be told when to start these exercises and which ones will work best for you. How to do the exercises  Biceps stretch   1. Stand and hold your affected arm out to the side, with your hand at about hip level. 2. Gently bend your wrist back so that your fingers point down toward the floor. 3. You may also do this next to a wall and rest your fingers on the wall. 4. For more of a stretch, bend your head to the opposite side of your affected arm. 5. Hold for 15 to 30 seconds. 6. Repeat 2 to 4 times. Resisted supination   1. Sit leaning forward with your legs slightly spread. Then place your forearm on your thigh with your hand and affected wrist in front of your knee. 2. Grasp one end of an exercise band with your palm down, and step on the other end. 3. Keeping your wrist straight, roll your palm outward and away from your thigh for a count of 2, then slowly move your wrist back to the starting position to a count of 5.  4. Repeat 8 to 12 times. Resisted elbow flexion   1. Using your affected arm, hold one end of an elastic band in your hand. 2. Place the other end of the band under your foot on the same side of your body as your affected arm. 3. Slowly bend your elbow and bring your hand toward your shoulder. Your palm and the underside of your wrist should be facing up as you pull the band toward your shoulder. Count to 2 as you pull up. 4. Relax and slowly return to your starting position. Count to 5 as you return to the start. 5. Repeat 8 to 12 times. Resisted elbow flexion at shoulder level   1. Tie the ends of an exercise band together to form a loop.  Attach one end of the loop to a secure object or shut a door on it to

## 2020-02-27 NOTE — TELEPHONE ENCOUNTER
I spoke with the CT department of [place] [id number] who gave me the correct name of the order and I did place the order and cancelled the previous order.

## 2020-02-27 NOTE — PROGRESS NOTES
Visit Information    Have you changed or started any medications since your last visit including any over-the-counter medicines, vitamins, or herbal medicines? no   Have you stopped taking any of your medications? Is so, why? -  no  Are you having any side effects from any of your medications? - no    Have you seen any other physician or provider since your last visit?  no   Have you had any other diagnostic tests since your last visit?  no   Have you been seen in the emergency room and/or had an admission in a hospital since we last saw you?  no   Have you had your routine dental cleaning in the past 6 months?  no     Do you have an active MyChart account? If no, what is the barrier?   Yes    Patient Care Team:  KAYLA Field CNP as PCP - General (Nurse Practitioner Family)  KAYLA Field CNP as PCP - [facility]  Yenny Lopez MD (Endocrinology, Diabetes, & Metabolism)    Medical History Review  Past Medical, Family, and Social History reviewed and does contribute to the patient presenting condition    Health Maintenance   Topic Date Due    Hepatitis B vaccine (1 of 3 - Risk 3-dose series) 06/10/1988    Cervical cancer screen  06/15/2018    Colon cancer screen colonoscopy  06/10/2019    Lipid screen  11/12/2019    Diabetic foot exam  01/24/2020    Flu vaccine (1) 06/30/2020 (Originally 9/1/2019)    Shingles Vaccine (1 of 2) 02/27/2021 (Originally 6/10/2019)    Potassium monitoring  03/18/2020    Creatinine monitoring  03/18/2020    Breast cancer screen  07/24/2020    A1C test (Diabetic or Prediabetic)  09/09/2020    Diabetic retinal exam  09/19/2020    DTaP/Tdap/Td vaccine (2 - Td) 01/24/2029    Pneumococcal 0-64 years Vaccine  Completed    HIV screen  Completed    Hepatitis A vaccine  Aged Out    Hib vaccine  Aged Out    Meningococcal (ACWY) vaccine  Aged Out

## 2020-02-27 NOTE — PROGRESS NOTES
membrane, ear canal and external ear normal.      Left Ear: Tympanic membrane, ear canal and external ear normal.      Nose: No mucosal edema, congestion or rhinorrhea. Right Turbinates: Not enlarged or swollen. Left Turbinates: Not enlarged or swollen. Right Sinus: No maxillary sinus tenderness or frontal sinus tenderness. Left Sinus: No maxillary sinus tenderness or frontal sinus tenderness. Mouth/Throat:      Lips: No lesions. Mouth: Mucous membranes are moist.      Tongue: No lesions. Pharynx: Oropharynx is clear. Tonsils: No tonsillar exudate. Eyes:      Conjunctiva/sclera: Conjunctivae normal.   Neck:      Musculoskeletal: Normal range of motion. Thyroid: Thyromegaly present. Vascular: No carotid bruit or JVD. Cardiovascular:      Rate and Rhythm: Normal rate and regular rhythm. No extrasystoles are present. Chest Wall: PMI is not displaced. No thrill. Pulses:           Radial pulses are 2+ on the right side and 2+ on the left side. Dorsalis pedis pulses are 2+ on the right side and 2+ on the left side. Posterior tibial pulses are 2+ on the right side and 2+ on the left side. Heart sounds: Normal heart sounds. No murmur. No friction rub. No gallop. Pulmonary:      Effort: Pulmonary effort is normal. No accessory muscle usage or respiratory distress. Breath sounds: Normal breath sounds and air entry. No stridor. No decreased breath sounds, wheezing, rhonchi or rales. Musculoskeletal: Normal range of motion. Left upper arm: She exhibits tenderness, bony tenderness and swelling (Very slight, no erythema, no warmth). She exhibits no edema, no deformity and no laceration. Left hand: Normal.      Right lower leg: No edema. Left lower leg: No edema. Right foot: Normal range of motion. No deformity, bunion, Charcot foot, foot drop or prominent metatarsal heads. Left foot: Normal range of motion.  No deformity, bunion, Charcot foot, foot drop or prominent metatarsal heads. Feet:      Right foot:      Protective Sensation: 10 sites tested. 10 sites sensed. Skin integrity: Skin integrity normal.      Left foot:      Protective Sensation: 10 sites tested. 10 sites sensed. Skin integrity: Skin integrity normal.   Lymphadenopathy:      Cervical: No cervical adenopathy. Skin:     General: Skin is warm and dry. Findings: No erythema or rash. Neurological:      General: No focal deficit present. Mental Status: She is alert and oriented to person, place, and time. GCS: GCS eye subscore is 4. GCS verbal subscore is 5. GCS motor subscore is 6. Gait: Gait is intact. Gait normal.   Psychiatric:         Attention and Perception: Attention normal.         Mood and Affect: Mood normal.         Speech: Speech normal.         Behavior: Behavior normal.       Data:     Lab Results   Component Value Date     03/18/2019    K 4.0 03/18/2019    CL 99 03/18/2019    CO2 25 03/18/2019    BUN 17 03/18/2019    CREATININE 0.63 03/18/2019    GLUCOSE 113 03/18/2019    GLUCOSE 114 06/20/2016    PROT 7.4 03/18/2019    PROT 6.7 06/20/2016    LABALBU 4.5 03/18/2019    LABALBU 4.2 06/20/2016    BILITOT 0.30 03/18/2019    ALKPHOS 79 03/18/2019    AST 28 03/18/2019    ALT 21 03/18/2019     Lab Results   Component Value Date    WBC 9.2 03/18/2019    RBC 5.04 03/18/2019    RBC 4.59 06/20/2016    HGB 14.8 03/18/2019    HCT 44.6 03/18/2019    MCV 88.5 03/18/2019    MCH 29.4 03/18/2019    MCHC 33.2 03/18/2019    RDW 13.4 03/18/2019     03/18/2019    MPV 8.8 03/18/2019     Lab Results   Component Value Date    TSH 0.77 06/22/2018     Lab Results   Component Value Date    CHOL 150 11/12/2018    HDL 53 11/12/2018    LABA1C 6.3 09/09/2019     Assessment & Plan:      1.  Benign hypertension with CKD (chronic kidney disease) stage I  -Stable: Con't all medications as ordered, con't f/u with nephrology as

## 2020-03-03 ENCOUNTER — HOSPITAL ENCOUNTER (OUTPATIENT)
Dept: ULTRASOUND IMAGING | Age: 50
Discharge: HOME | End: 2020-03-05
Payer: MEDICARE

## 2020-03-03 ENCOUNTER — HOSPITAL ENCOUNTER (OUTPATIENT)
Age: 50
Discharge: HOME | End: 2020-03-03
Payer: MEDICARE

## 2020-03-03 ENCOUNTER — HOSPITAL ENCOUNTER (OUTPATIENT)
Dept: GENERAL RADIOLOGY | Age: 50
Discharge: HOME | End: 2020-03-05
Payer: MEDICARE

## 2020-03-03 ENCOUNTER — HOSPITAL ENCOUNTER (OUTPATIENT)
Age: 50
Discharge: HOME | End: 2020-03-05
Payer: MEDICARE

## 2020-03-03 LAB
BUN BLDV-MCNC: 12 MG/DL (ref 6–20)
CHOLESTEROL/HDL RATIO: 3.1
CHOLESTEROL: 129 MG/DL
CREAT SERPL-MCNC: 0.54 MG/DL (ref 0.5–0.9)
GFR AFRICAN AMERICAN: >60 ML/MIN
GFR NON-AFRICAN AMERICAN: >60 ML/MIN
GFR SERPL CREATININE-BSD FRML MDRD: NORMAL ML/MIN/{1.73_M2}
GFR SERPL CREATININE-BSD FRML MDRD: NORMAL ML/MIN/{1.73_M2}
HBV SURFACE AB TITR SER: <3.5 MIU/ML
HDLC SERPL-MCNC: 41 MG/DL
LDL CHOLESTEROL: 59 MG/DL (ref 0–130)
TRIGL SERPL-MCNC: 145 MG/DL
TROPONIN INTERP: NORMAL
TROPONIN T: NORMAL NG/ML
TROPONIN, HIGH SENSITIVITY: <6 NG/L (ref 0–14)
TSH SERPL DL<=0.05 MIU/L-ACNC: 0.64 MIU/L (ref 0.3–5)
VLDLC SERPL CALC-MCNC: NORMAL MG/DL (ref 1–30)

## 2020-03-03 PROCEDURE — 82565 ASSAY OF CREATININE: CPT

## 2020-03-03 PROCEDURE — 36415 COLL VENOUS BLD VENIPUNCTURE: CPT

## 2020-03-03 PROCEDURE — 84443 ASSAY THYROID STIM HORMONE: CPT

## 2020-03-03 PROCEDURE — 93005 ELECTROCARDIOGRAM TRACING: CPT

## 2020-03-03 PROCEDURE — 86317 IMMUNOASSAY INFECTIOUS AGENT: CPT

## 2020-03-03 PROCEDURE — 84484 ASSAY OF TROPONIN QUANT: CPT

## 2020-03-03 PROCEDURE — 76536 US EXAM OF HEAD AND NECK: CPT

## 2020-03-03 PROCEDURE — 80061 LIPID PANEL: CPT

## 2020-03-03 PROCEDURE — 84520 ASSAY OF UREA NITROGEN: CPT

## 2020-03-03 PROCEDURE — 73060 X-RAY EXAM OF HUMERUS: CPT

## 2020-03-04 LAB
EKG ATRIAL RATE: 79 BPM
EKG P AXIS: 15 DEGREES
EKG P-R INTERVAL: 134 MS
EKG Q-T INTERVAL: 412 MS
EKG QRS DURATION: 82 MS
EKG QTC CALCULATION (BAZETT): 472 MS
EKG R AXIS: 22 DEGREES
EKG T AXIS: 19 DEGREES
EKG VENTRICULAR RATE: 79 BPM

## 2020-03-07 NOTE — PROGRESS NOTES
Peripheral vascular : PEDAL PULSES GOOD. NO VARICOSE VEINS. Rheumatological : NO JOINT SWELLING  Neurological/Memory: ALERT  AND ORIENTED  X 3 ,MONOFILAMENT SENSATIONS NORMAL. REFLEXES NORMAL. Psychiatric:  MOOD AND AFFECT NORMAL  Skin: NO RASHES      Assessment:     LAB / IMAGING:    THYROID ULTRASOUND       3/3/2020         FINDINGS:   Right thyroid lobe:  47.7 x 38.8 x 82.4 mm       Left thyroid lobe:  22.9 x 22.7 x 59.5 mm       Isthmus:  8.4 mm       Thyroid Gland:  Thyroid gland is diffusely enlarged and heterogeneous with   normal color Doppler flow.  The right lobe remains larger than the left.       Nodules: Again shown is a dominant nodule encompassing a large portion of the   right lobe.  Subcentimeter  hypoechoic nodule in the left lobe for which no   specific follow-up imaging is recommended.       NODULE: Right 1       Size: 58.5 x 49.6 x 79.8 mm       Location: Right lobe       1. Composition:  Almost completely solid (2)       2. Echogenicity:  Hypoechoic (2)       3. Shape:  Wider-than-tall (0)       4. Margins:  Lobulated (2)       5. Echogenic foci:  None (0)       ACR TI-RADS total points:  6       ACR TI-RADS risk category: TR4       Prior biopsy: Yes. Benign       Significant growth:  Yes. Previously measured 42 x 34 x 59 mm       Change in features:  No.       Change in ACR TI-RADS risk category:  No.       Cervical lymphadenopathy: No abnormal lymph nodes in the imaged portions of   the neck.           Similar multinodular gland with a dominant TR 4 nodule in the right lobe   measuring up to 8 cm, increased in size since the prior study.  This was   reportedly previously biopsied with benign results.  Correlate with clinical   findings to determine need for further evaluation.       Subcentimeter left lobe nodule for which no specific follow-up imaging is   recommended.        TSH   Order: [id number]   Collected:  3/3/2020 12:02      Ref Range & Units 4d ago   TSH 0.30 - 5.00 mIU/L 0.64 Lipid Panel   Order: [id number]   Collected:  3/3/2020 12:02      Ref Range & Units 4d ago   Cholesterol <200 mg/dL 129       HDL >40 mg/dL 41           LDL Cholesterol 0 - 130 mg/dL 59               Triglycerides <150 mg/dL 145    Comment:              BUN & Creatinine   Order: [id number]   Collected:  3/3/2020 12:02      Ref Range & Units 4d ago   BUN 6 - 20 mg/dL 12    CREATININE 0.50 - 0.90 mg/dL 0.54    GFR Non-African American >60 mL/min >60    GFR African American >60 mL/min >60    GFR Comment                POCT glycosylated hemoglobin (Hb A1C)   Order: [id number]   Collected:  9/9/2019 13:50      Ref Range & Units 6mo ago   Hemoglobin A1C % 6.3              POCT  A1C 9/9/19,  6.3 %    LAB ORDERED BY OTHER M.D ON 6/25/19 NOT  DONE YET    10/9/2018 11:57 AM - Lenny, jose Incoming Lab Results From [facility]     Component Results     Component Value Ref Range & Units Status Collected Lab   Thyroglobulin Ab <20.0  0.0 - 40.0 IU/mL Final 10/08/2018  1:32  [address]   Thyroid Peroxidase (Tpo) Ab <10.0  0.0 - 35.0 IU/mL Final       10/8/2018  9:15 PM - Lenny, pn Incoming Lab Results From [facility]     Component Results     Component Value Ref Range & Units Status Collected Lab   Color, UA YELLOW  YEL Final 10/08/2018  1:32  [address]   Turbidity UA CLEAR  CLEAR Final 10/08/2018  1:32  [address]   Glucose, Ur NEGATIVE  NEG Final 10/08/2018  1:32  [address]   Bilirubin Urine NEGATIVE  NEG Final 10/08/2018  1:32  [address]   Ketones, Urine TRACE   NEG Final 10/08/2018  1:32  [address]   Specific [place], UA 1.023  1.005 - 1.030 Final 10/08/2018  1:32  [address]   Urine Hgb NEGATIVE  NEG Final 10/08/2018  1:32  [address]   pH, UA 5.0  5.0 - 8.0 Final 10/08/2018  1:32  [address]   Protein, UA NEGATIVE  NEG Final 10/08/2018  1:32 PM [facility]     2. Echogenicity:  Isoechoic (1)       3. Shape:  Wider-than-tall (0)       4. Margins:  Smooth (0)       5. Echogenic foci:  None (0)       ACR TI-RADS total points:  2       ACR TI-RADS risk category: TR2       Prior biopsy: Reported previous biopsy was benign in 2015       Significant growth:  Decreased in size       Change in features:  No       Change in ACR TI-RADS risk category:  No       Cervical lymphadenopathy: No abnormal lymph nodes in the imaged portions of   the neck. Large heterogeneous nodular area involving a majority of the right   thyroid lobe.  Reportedly, this has been previously biopsied in 2015 and was   benign. Michelle Monroy appears relatively stable when compared to previous ultrasounds   dating back to 2013.  Correlation with pathology results is recommended. Follow-up as clinically indicated.       2.  Mixed cystic and solid nodule at the inferior left thyroid lobe is   decreased in size.  Reportedly, this was also previously biopsied and benign. No follow-up imaging is necessary for this nodule. HAD FINE NEEDLE THYROID IN PAST .  REPORT NOT AVAILABLE.      6/22/2018 10:36 PM - Lenny, [facility] Incoming Lab Results From [facility]     Component Results     Component Value Ref Range & Units Status Collected Lab   Vit D, 25-Hydroxy 14.3   30.0 - 100.0 ng/mL Final         6/22/2018 10:30 PM - Lenny, [facility] Incoming Lab Results From [facility]     Component Results     Component Value Ref Range & Units Status Collected Lab   TSH 0.77  0.30 - 5.00 mIU/L Final       4/17/2018  8:54 AM - Cristela Brina     Component Results     Component Value Ref Range & Units Status Collected Lab   Hemoglobin A1C 6.2  %      4/17/2018 12:55 PM - Lenny, [facility] Incoming Lab Results From [facility]     Component Results     Component Value Ref Range & Units Status Collected Lab   Cholesterol 192  <200 mg/dL Final 01/17/2018 12:15  [address] [facility]              HDL 38   >40 mg/dL Final 01/17/2018 12:15 PM [facility]-  2.5 Final 01/17/2018 12:15  [address] [facility]   GFR Non-African American >60  >60 mL/min Final 01/17/2018 12:15  [address] [facility]   GFR  >60          1/10/2018 11:09 PM - Lenny, [facility] Incoming Lab Results From [facility]     Component Results     Component Value Ref Range & Units Status Collected Lab   Microalb, Ur 706   <21 mg/L Final 01/10/2018  9:44  [address]   Creatinine, Ur 234.5   28.0 - 217.0 mg/dL Final 01/10/2018  9:44  [address]   Microalb/Crt. Ratio 301   <25 mcg/mg creat Final 01/10/2018  9:44 PM 3 [address]      6/20/2016  8:14 PM - Lenny, Lab In [facility]     Component Results     Component Value Ref Range & Units Status Collected Lab   T3, Free 3.7  2.2 - 4.0 pg/mL Final 06/20/2016  2:58  [address]        6/20/2016  8:14 PM - Lenny, Lab In [facility]     Component Results     Component Value Ref Range & Units Status Collected Lab   T4 Free 0.89  0.71 - 1.85 ng/dL Final 06/20/2016  2:58  [address]        1/10/2018  9:07 AM - Cece Barlow MA     Component Results     Component Value Ref Range & Units Status Collected Lab   Hemoglobin A1C 8.9  % Final 01/10/2018  8:45 AM Unknown     7/20/2018  5:29 PM - Lenny, [facility] Incoming Lab Results From [facility]     Component Results     Component Value Ref Range & Units Status Collected Lab   Microalb, Ur 14  <21 mg/L Final 07/20/2018  4:26  [address]   Creatinine, Ur 281.5   28.0 - 217.0 mg/dL Final 07/20/2018  4:26  [address]   Microalb/Crt. Ratio 5  <25 mcg/mg creat              IMPRESSION:    MULTINODULAR GOITER  LARGE  RT THYROID NODULE. BENIGN ON FINE NEEDLE ASPIRATION IN 2015. INCREASED IN SIZE  ON ULTRASOUND  3/3/20    DIABETES  TYPE 2 CONTROLLED POCT  A1C 9/9/19,  6.3 %    HX OF ASTHMA    HX  HYPERCHOLESTEROLEMIA      OBESITY        1. Multinodular goiter    2.  Type 2 diabetes mellitus without complication, without long-term current use of insulin ([facility] [address]              Plan:      1. THYROID ULTRASOUND  FROM 3/3/20  REPORT  DISCUSSED. SUGGESTED SURGERY AND REPEATING FINE NEEDLE. PATIENT DOES NOT WANT SURGERY. HAS NO PRESSURE SYMPTOMS AND IS ASYMPTOMATIC  2. LAB FROM 3/3/20  DISCUSSED  3. ARRANGE FINE NEEDLE THYROID. 4. DIET AND EXERCISE. 5. CONTINUE  METFORMIN  MG BID. LIPITOR 10 MG  DAILY AND LISINOPRIL 2.5 MG DAILY  6. CONTINUE ALL  MEDS AS PER OTHER M.D. REVIEWED AND  DISCUSSED  7. RETURN  2  MONTHS       No orders of the defined types were placed in this encounter. No orders of the defined types were placed in this encounter. No follow-ups on file. Visit date not found        Patient given educational materials - see patient instructions. Discussed use, benefit, and side effects of prescribed medications. All patient questions answered. Pt voiced understanding. Reviewed health maintenance. Instructed to continue current medications, diet and exercise. Patient agreed with treatment plan. Follow up as directed.      Electronically signed by Lynne Mccray MD on 10/7/18 at 9:06 PM

## 2020-03-09 ENCOUNTER — OFFICE VISIT (OUTPATIENT)
Dept: ENDOCRINOLOGY | Age: 50
End: 2020-03-09
Payer: MEDICARE

## 2020-03-09 VITALS
DIASTOLIC BLOOD PRESSURE: 88 MMHG | HEART RATE: 58 BPM | SYSTOLIC BLOOD PRESSURE: 138 MMHG | BODY MASS INDEX: 39.4 KG/M2 | HEIGHT: 68 IN | WEIGHT: 260 LBS

## 2020-03-09 PROCEDURE — 2022F DILAT RTA XM EVC RTNOPTHY: CPT | Performed by: INTERNAL MEDICINE

## 2020-03-09 PROCEDURE — G8427 DOCREV CUR MEDS BY ELIG CLIN: HCPCS | Performed by: INTERNAL MEDICINE

## 2020-03-09 PROCEDURE — G8417 CALC BMI ABV UP PARAM F/U: HCPCS | Performed by: INTERNAL MEDICINE

## 2020-03-09 PROCEDURE — G8484 FLU IMMUNIZE NO ADMIN: HCPCS | Performed by: INTERNAL MEDICINE

## 2020-03-09 PROCEDURE — 3017F COLORECTAL CA SCREEN DOC REV: CPT | Performed by: INTERNAL MEDICINE

## 2020-03-09 PROCEDURE — 99214 OFFICE O/P EST MOD 30 MIN: CPT | Performed by: INTERNAL MEDICINE

## 2020-03-09 PROCEDURE — 1036F TOBACCO NON-USER: CPT | Performed by: INTERNAL MEDICINE

## 2020-03-09 PROCEDURE — 3046F HEMOGLOBIN A1C LEVEL >9.0%: CPT | Performed by: INTERNAL MEDICINE

## 2020-03-10 ENCOUNTER — HOSPITAL ENCOUNTER (OUTPATIENT)
Dept: CT IMAGING | Age: 50
Discharge: HOME | End: 2020-03-12
Payer: MEDICARE

## 2020-03-10 VITALS
HEIGHT: 68 IN | WEIGHT: 260 LBS | OXYGEN SATURATION: 98 % | SYSTOLIC BLOOD PRESSURE: 136 MMHG | BODY MASS INDEX: 39.4 KG/M2 | RESPIRATION RATE: 16 BRPM | DIASTOLIC BLOOD PRESSURE: 72 MMHG | HEART RATE: 50 BPM

## 2020-03-10 PROCEDURE — 2580000003 HC RX 258: Performed by: NURSE PRACTITIONER

## 2020-03-10 PROCEDURE — 6370000000 HC RX 637 (ALT 250 FOR IP): Performed by: NURSE PRACTITIONER

## 2020-03-10 PROCEDURE — 6360000004 HC RX CONTRAST MEDICATION: Performed by: NURSE PRACTITIONER

## 2020-03-10 PROCEDURE — 75574 CT ANGIO HRT W/3D IMAGE: CPT

## 2020-03-10 RX ORDER — SODIUM CHLORIDE 9 MG/ML
INJECTION, SOLUTION INTRAVENOUS CONTINUOUS
Status: DISCONTINUED | OUTPATIENT
Start: 2020-03-10 | End: 2020-03-13 | Stop reason: HOSPADM

## 2020-03-10 RX ORDER — SODIUM CHLORIDE 0.9 % (FLUSH) 0.9 %
10 SYRINGE (ML) INJECTION PRN
Status: DISCONTINUED | OUTPATIENT
Start: 2020-03-10 | End: 2020-03-13 | Stop reason: HOSPADM

## 2020-03-10 RX ORDER — NITROGLYCERIN 0.4 MG/1
0.4 TABLET SUBLINGUAL ONCE
Status: COMPLETED | OUTPATIENT
Start: 2020-03-10 | End: 2020-03-10

## 2020-03-10 RX ORDER — 0.9 % SODIUM CHLORIDE 0.9 %
90 INTRAVENOUS SOLUTION INTRAVENOUS ONCE
Status: COMPLETED | OUTPATIENT
Start: 2020-03-10 | End: 2020-03-10

## 2020-03-10 RX ADMIN — SODIUM CHLORIDE: 9 INJECTION, SOLUTION INTRAVENOUS at 13:20

## 2020-03-10 RX ADMIN — NITROGLYCERIN 0.4 MG: 0.4 TABLET, ORALLY DISINTEGRATING SUBLINGUAL at 14:09

## 2020-03-10 RX ADMIN — Medication 10 ML: at 14:21

## 2020-03-10 RX ADMIN — SODIUM CHLORIDE 90 ML: 9 INJECTION, SOLUTION INTRAVENOUS at 14:21

## 2020-03-10 RX ADMIN — IOPAMIDOL 95 ML: 755 INJECTION, SOLUTION INTRAVENOUS at 14:21

## 2020-03-10 ASSESSMENT — PAIN DESCRIPTION - LOCATION
LOCATION: HEAD
LOCATION: HEAD

## 2020-03-10 ASSESSMENT — PAIN SCALES - GENERAL
PAINLEVEL_OUTOF10: 0
PAINLEVEL_OUTOF10: 0
PAINLEVEL_OUTOF10: 2
PAINLEVEL_OUTOF10: 0
PAINLEVEL_OUTOF10: 1
PAINLEVEL_OUTOF10: 0
PAINLEVEL_OUTOF10: 0

## 2020-03-10 ASSESSMENT — PAIN - FUNCTIONAL ASSESSMENT: PAIN_FUNCTIONAL_ASSESSMENT: 0-10

## 2020-03-11 RX ORDER — METFORMIN HYDROCHLORIDE 750 MG/1
TABLET, EXTENDED RELEASE ORAL
Qty: 60 TABLET | Refills: 2 | Status: SHIPPED | OUTPATIENT
Start: 2020-03-11 | End: 2020-06-03

## 2020-03-11 RX ORDER — ATORVASTATIN CALCIUM 10 MG/1
10 TABLET, FILM COATED ORAL NIGHTLY
Qty: 30 TABLET | Refills: 2 | Status: SHIPPED | OUTPATIENT
Start: 2020-03-11 | End: 2020-06-03

## 2020-03-11 RX ORDER — ALBUTEROL SULFATE 90 UG/1
AEROSOL, METERED RESPIRATORY (INHALATION)
Qty: 18 G | Refills: 3 | Status: SHIPPED | OUTPATIENT
Start: 2020-03-11

## 2020-03-11 RX ORDER — LISINOPRIL 2.5 MG/1
TABLET ORAL
Qty: 30 TABLET | Refills: 2 | Status: SHIPPED | OUTPATIENT
Start: 2020-03-11 | End: 2020-06-03

## 2020-03-11 RX ORDER — PEN NEEDLE, DIABETIC 32GX 5/32"
NEEDLE, DISPOSABLE MISCELLANEOUS
Qty: 100 EACH | Refills: 3 | Status: SHIPPED | OUTPATIENT
Start: 2020-03-11 | End: 2020-08-19

## 2020-03-11 RX ORDER — ASPIRIN 81 MG/1
TABLET, COATED ORAL
Qty: 90 TABLET | Refills: 3 | Status: SHIPPED | OUTPATIENT
Start: 2020-03-11

## 2020-03-13 ENCOUNTER — TELEPHONE (OUTPATIENT)
Dept: FAMILY MEDICINE CLINIC | Age: 50
End: 2020-03-13

## 2020-03-18 ENCOUNTER — HOSPITAL ENCOUNTER (OUTPATIENT)
Age: 50
Discharge: HOME | End: 2020-03-18
Payer: MEDICARE

## 2020-03-18 LAB
ABSOLUTE EOS #: 0.2 K/UL (ref 0–0.4)
ABSOLUTE IMMATURE GRANULOCYTE: NORMAL K/UL (ref 0–0.3)
ABSOLUTE LYMPH #: 2.6 K/UL (ref 1–4.8)
ABSOLUTE MONO #: 0.3 K/UL (ref 0.1–1.3)
ALBUMIN SERPL-MCNC: 4.3 G/DL (ref 3.5–5.2)
ALBUMIN/GLOBULIN RATIO: ABNORMAL (ref 1–2.5)
ALP BLD-CCNC: 90 U/L (ref 35–104)
ALT SERPL-CCNC: 30 U/L (ref 5–33)
ANION GAP SERPL CALCULATED.3IONS-SCNC: 13 MMOL/L (ref 9–17)
AST SERPL-CCNC: 16 U/L
BASOPHILS # BLD: 1 % (ref 0–2)
BASOPHILS ABSOLUTE: 0.1 K/UL (ref 0–0.2)
BILIRUB SERPL-MCNC: 0.37 MG/DL (ref 0.3–1.2)
BILIRUBIN URINE: NEGATIVE
BUN BLDV-MCNC: 10 MG/DL (ref 6–20)
BUN/CREAT BLD: ABNORMAL (ref 9–20)
CALCIUM SERPL-MCNC: 9.7 MG/DL (ref 8.6–10.4)
CHLORIDE BLD-SCNC: 101 MMOL/L (ref 98–107)
CO2: 23 MMOL/L (ref 20–31)
COLOR: YELLOW
COMMENT UA: NORMAL
CREAT SERPL-MCNC: 0.63 MG/DL (ref 0.5–0.9)
DIFFERENTIAL TYPE: NORMAL
EOSINOPHILS RELATIVE PERCENT: 3 % (ref 0–4)
GFR AFRICAN AMERICAN: >60 ML/MIN
GFR NON-AFRICAN AMERICAN: >60 ML/MIN
GFR SERPL CREATININE-BSD FRML MDRD: ABNORMAL ML/MIN/{1.73_M2}
GFR SERPL CREATININE-BSD FRML MDRD: ABNORMAL ML/MIN/{1.73_M2}
GLUCOSE BLD-MCNC: 141 MG/DL (ref 70–99)
GLUCOSE URINE: NEGATIVE
HCT VFR BLD CALC: 44.3 % (ref 36–46)
HEMOGLOBIN: 14.9 G/DL (ref 12–16)
IMMATURE GRANULOCYTES: NORMAL %
KETONES, URINE: NEGATIVE
LEUKOCYTE ESTERASE, URINE: NEGATIVE
LYMPHOCYTES # BLD: 33 % (ref 24–44)
MAGNESIUM: 1.5 MG/DL (ref 1.6–2.6)
MCH RBC QN AUTO: 29.1 PG (ref 26–34)
MCHC RBC AUTO-ENTMCNC: 33.6 G/DL (ref 31–37)
MCV RBC AUTO: 86.7 FL (ref 80–100)
MONOCYTES # BLD: 4 % (ref 1–7)
NITRITE, URINE: NEGATIVE
NRBC AUTOMATED: NORMAL PER 100 WBC
PDW BLD-RTO: 13 % (ref 11.5–14.9)
PH UA: 5.5 (ref 5–8)
PHOSPHORUS: 3.6 MG/DL (ref 2.6–4.5)
PLATELET # BLD: 329 K/UL (ref 150–450)
PLATELET ESTIMATE: NORMAL
PMV BLD AUTO: 8.3 FL (ref 6–12)
POTASSIUM SERPL-SCNC: 4.3 MMOL/L (ref 3.7–5.3)
PROTEIN UA: NEGATIVE
RBC # BLD: 5.11 M/UL (ref 4–5.2)
RBC # BLD: NORMAL 10*6/UL
SEG NEUTROPHILS: 59 % (ref 36–66)
SEGMENTED NEUTROPHILS ABSOLUTE COUNT: 4.8 K/UL (ref 1.3–9.1)
SODIUM BLD-SCNC: 137 MMOL/L (ref 135–144)
SPECIFIC GRAVITY UA: 1.01 (ref 1–1.03)
TOTAL PROTEIN: 7.3 G/DL (ref 6.4–8.3)
TURBIDITY: CLEAR
URINE HGB: NEGATIVE
UROBILINOGEN, URINE: NORMAL
WBC # BLD: 7.9 K/UL (ref 3.5–11)
WBC # BLD: NORMAL 10*3/UL

## 2020-03-18 PROCEDURE — 84100 ASSAY OF PHOSPHORUS: CPT

## 2020-03-18 PROCEDURE — 80053 COMPREHEN METABOLIC PANEL: CPT

## 2020-03-18 PROCEDURE — 83735 ASSAY OF MAGNESIUM: CPT

## 2020-03-18 PROCEDURE — 36415 COLL VENOUS BLD VENIPUNCTURE: CPT

## 2020-03-18 PROCEDURE — 85025 COMPLETE CBC W/AUTO DIFF WBC: CPT

## 2020-03-18 PROCEDURE — 81003 URINALYSIS AUTO W/O SCOPE: CPT

## 2020-03-18 PROCEDURE — 83036 HEMOGLOBIN GLYCOSYLATED A1C: CPT

## 2020-03-19 LAB
ESTIMATED AVERAGE GLUCOSE: 157 MG/DL
HBA1C MFR BLD: 7.1 % (ref 4–6)

## 2020-03-25 NOTE — PROGRESS NOTES
[facility]  Offices: [address], [facility], [address] [facility]  [address], [address], [place], [address] [facility]  [address] [place] [address] Juni Guerrero, [address]  Phone: [phone]  Fax: [phone]    MD Geovanny Casillas MD Ahmed B. Sandie Clap, MD Jimmie Seaman, MD Omar Fearing, MD Liliana Melchor, CNP    TELEHEALTH VISIT        3/30/2020      HISTORY OF PRESENT ILLNESS:       I had the pleasure of seeing Robert Reeves, who returns for continuing neurologic care for memory loss (onset in 2017), mostly attentional issues secondary to depression as seen on neuropsychological testing. She also has sensory polyneuropathy likely secondary to diabetes but currently not symptomatic. On her last visit, I adjusted her CPAP pressures to 5 to 12 cm water, increase her humidity and gave her a sample of a small DreamWear full facemask since she was having issues with mouth breathing, dry mouth and air hunger. Patient reports this has helped significantly. The mask is much more comfortable for her, and she does not have air hunger anymore. However, due to the recent quarantine and staying home all day, her sleep patterns have significantly changed. She is sleeping on and off during the day, and then staying up at night. Because of this, she has not been using her CPAP as much as she used to. She reports using it about 3 to 4 days a week, but otherwise has no problems with it. She does feel like she is getting more and better quality sleep when she uses her CPAP. She continues to report some issues with focus and attention. She is planning to lose weight hoping that that would help. She also has a history of depression but feels that it is not severe. She denies any new symptoms, denies any changes in medications other than magnesium for hypomagnesemia.     Prior testing reviewed:  MRI brain with and without contrast 11/12/18: Acute sinusitis otherwise negative brain  Neuropsychological testing [facility] [place]: No evidence of cognitive dysfunction, testing results were actually above average in many domains. EMG 8/29/18: There is electrophysiologic evidence of sensory neuropathy with predominant demyelinating features. This study did not demonstrate any electrodiagnostic evidence of lumbosacral radiculopathy or plexopathy in the nerves and muscles tested.  Clinical correlation is recommended. Noncontrast head CT June 2018:  Negative CT brain with no acute intracranial abnormality.  Incidental   bilateral sphenoid sinusitis as described.            PAST MEDICAL HISTORY:         Diagnosis Date    Asthma     Benign hypertension with CKD (chronic kidney disease) stage I 1/4/2019    CKD (chronic kidney disease) stage 1, GFR 90 ml/min or greater 1/4/2019    Goiter     History of skull fracture 4/19/2017    [facility] to [facility] 1/11/2018    Morbid obesity ([facility]     Secondary diabetes mellitus with stage 1 chronic kidney disease ([facility] 1/4/2019        PAST SURGICAL HISTORY:         Procedure Laterality Date    CAUTERIZE INNER NOSE  08/31/2016    FRACTURE SURGERY      small toe on right foot  and left baby finger    SINUS SURGERY  08/31/2016    TONSILLECTOMY          SOCIAL HISTORY:     Social History     Socioeconomic History    Marital status: Single     Spouse name: Not on file    Number of children: Not on file    Years of education: Not on file    Highest education level: Not on file   Occupational History    Not on file   Social Needs    Financial resource strain: Not on file    Food insecurity     Worry: Not on file     Inability: Not on file    Transportation needs     Medical: Not on file     Non-medical: Not on file   Tobacco Use    Smoking status: Never Smoker    Smokeless tobacco: Never Used   Substance and Sexual Activity    Alcohol use: No     Alcohol/week: 0.0 standard drinks    Drug use: No    Sexual activity: Not Currently     Partners: Male     Comment: no current partner   Lifestyle    Physical activity     Days per week: Not on file     Minutes per session: Not on file    Stress: Not on file   Relationships    Social connections     Talks on phone: Not on file     Gets together: Not on file     Attends Taoist service: Not on file     Active member of club or organization: Not on file     Attends meetings of clubs or organizations: Not on file     Relationship status: Not on file    Intimate partner violence     Fear of current or ex partner: Not on file     Emotionally abused: Not on file     Physically abused: Not on file     Forced sexual activity: Not on file   Other Topics Concern    Not on file   Social History Narrative    Not on file       CURRENT MEDICATIONS:     Current Outpatient Medications   Medication Sig Dispense Refill    magnesium oxide (MAG-OX) 400 MG tablet Take 1 tablet by mouth daily 30 tablet 1    atorvastatin (LIPITOR) 10 MG tablet TAKE 1 TABLET BY MOUTH NIGHTLY 30 tablet 2    metFORMIN (GLUCOPHAGE-XR) 750 MG extended release tablet TAKE 1 TABLET TWICE A DAY 60 tablet 2    lisinopril (PRINIVIL;ZESTRIL) 2.5 MG tablet TAKE 1 TABLET DAILY 30 tablet 2    Alcohol Swabs (PRO COMFORT ALCOHOL) 70 % PADS USE AS DIRECTED DAILY 100 each 3    albuterol sulfate  (90 Base) MCG/ACT inhaler TAKE 1-2 PUFFS EVERY 4 HOURS AS NEEDED FOR FOR SHORTNESS OF BREATH (SPACE OUT TO EVERY 6 HOURS AS SYMPTOMS IMPROVE) 18 g 3    ASPIRIN LOW DOSE 81 MG EC tablet TAKE 1 TABLET BY MOUTH DAILY 90 tablet 3    Respiratory Therapy Supplies (NEBULIZER/TUBING/MOUTHPIECE) KIT 1 kit by Does not apply route every 4 hours as needed (Use as needed w/DuoNeb's for wheezing/SOB) 1 kit 0    cetirizine (ZYRTEC) 10 MG tablet TAKE 1 TABLET BY MOUTH NIGHTLY AS NEEDED FOR ALLERGIES OR RHINITIS 30 tablet 3    Lancets (ONETOUCH DELICA PLUS [id number]) MISC USE AS DIRECTED DAILY 100 each 3    ONETOUCH VERIO strip USE

## 2020-03-30 ENCOUNTER — TELEMEDICINE (OUTPATIENT)
Dept: NEUROLOGY | Age: 50
End: 2020-03-30
Payer: MEDICARE

## 2020-03-30 PROCEDURE — 2022F DILAT RTA XM EVC RTNOPTHY: CPT | Performed by: PSYCHIATRY & NEUROLOGY

## 2020-03-30 PROCEDURE — 99214 OFFICE O/P EST MOD 30 MIN: CPT | Performed by: PSYCHIATRY & NEUROLOGY

## 2020-03-30 PROCEDURE — 3017F COLORECTAL CA SCREEN DOC REV: CPT | Performed by: PSYCHIATRY & NEUROLOGY

## 2020-03-30 PROCEDURE — 3051F HG A1C>EQUAL 7.0%<8.0%: CPT | Performed by: PSYCHIATRY & NEUROLOGY

## 2020-03-30 PROCEDURE — G8427 DOCREV CUR MEDS BY ELIG CLIN: HCPCS | Performed by: PSYCHIATRY & NEUROLOGY

## 2020-04-02 ENCOUNTER — VIRTUAL VISIT (OUTPATIENT)
Dept: FAMILY MEDICINE CLINIC | Age: 50
End: 2020-04-02
Payer: MEDICARE

## 2020-04-02 PROCEDURE — 3051F HG A1C>EQUAL 7.0%<8.0%: CPT | Performed by: NURSE PRACTITIONER

## 2020-04-02 PROCEDURE — G8427 DOCREV CUR MEDS BY ELIG CLIN: HCPCS | Performed by: NURSE PRACTITIONER

## 2020-04-02 PROCEDURE — 99213 OFFICE O/P EST LOW 20 MIN: CPT | Performed by: NURSE PRACTITIONER

## 2020-04-02 PROCEDURE — 2022F DILAT RTA XM EVC RTNOPTHY: CPT | Performed by: NURSE PRACTITIONER

## 2020-04-02 PROCEDURE — 3017F COLORECTAL CA SCREEN DOC REV: CPT | Performed by: NURSE PRACTITIONER

## 2020-04-02 ASSESSMENT — ENCOUNTER SYMPTOMS
WHEEZING: 1
TROUBLE SWALLOWING: 0
SHORTNESS OF BREATH: 0
VOICE CHANGE: 0

## 2020-04-02 NOTE — PROGRESS NOTES
2020    TELEHEALTH EVALUATION -- Audio/Visual (During IWPGB-85 public health emergency)    HPI:    Aly Diaz (:  1970) has requested an audio/video evaluation for the following concern(s):    1. Essential hypertension  2. PVC (premature ventricular contraction)  3. Abnormal stress test  -Recently had CTA coronary ejection fraction, which was normal  -Denies chest pain, palpitations  -BP has been stable  BP Readings from Last 3 Encounters:   03/10/20 136/72   20 138/88   20 134/86   -Does need to follow up with cardiology- a cardiac catch was recommended in the past, but she does not want to go forward with this  -On lisinopril 2.5 mg QD  -Previous cardiologist: Dr. Ayaan Mahan    4. Mild intermittent asthma without complication  -Asthma s/s has been stable  -Was exposed to mold recently and noted mild wheezing  -Smoke if a main trigger  -Using albuterol inhaler DuoNeb's PRN  -Denies fever, persistent cough    5. Type 2 diabetes mellitus with microalbuminuria, without long-term current use of insulin (HCC)  -Currently taking metformin 750 mg XR twice/daily- has been taking consistently  -Does admit to not being very active lately  -States her diet has been \"fine\"- avoids corn syrup  Wt Readings from Last 3 Encounters:   03/10/20 260 lb (117.9 kg)   20 260 lb (117.9 kg)   20 264 lb (119.7 kg)     Lab Results   Component Value Date    LABA1C 7.1 (H) 2020     Lab Results   Component Value Date     2020     6. Multinodular goiter  -Plan will be to have another bx in May  Lab Results   Component Value Date    TSH 0.64 2020     Review of Systems   Constitutional: Negative for fever. HENT: Negative for trouble swallowing and voice change. Respiratory: Positive for wheezing. Negative for shortness of breath. Cardiovascular: Negative for chest pain and palpitations.    Musculoskeletal: Positive for myalgias (Feels muscle pain to left arm, she has been trying to PAIN Yes Guy Bis, APRN - CNP   mupirocin (BACTROBAN) 2 % cream APPLY TOPICALLY 3 TIMES DAILY.  Yes Guy Bis, APRN - CNP   fluticasone (FLONASE) 50 MCG/ACT nasal spray 2 sprays by Nasal route daily Yes Glasgow Bis, APRN - CNP       Social History     Tobacco Use    Smoking status: Never Smoker    Smokeless tobacco: Never Used   Substance Use Topics    Alcohol use: No     Alcohol/week: 0.0 standard drinks    Drug use: No        Allergies   Allergen Reactions    Codeine Other (See Comments)     hallucinations   ,   Past Medical History:   Diagnosis Date    Asthma     Benign hypertension with CKD (chronic kidney disease) stage I 1/4/2019    CKD (chronic kidney disease) stage 1, GFR 90 ml/min or greater 1/4/2019    Goiter     History of skull fracture 4/19/2017    Microalbuminuria due to type 2 diabetes mellitus ([facility] [address] 1/11/2018    Morbid obesity ([facility]     Secondary diabetes mellitus with stage 1 chronic kidney disease ([facility] [facility] 1/4/2019   ,   Past Surgical History:   Procedure Laterality Date    CAUTERIZE INNER NOSE  08/31/2016    FRACTURE SURGERY      small toe on right foot  and left baby finger    SINUS SURGERY  08/31/2016    TONSILLECTOMY     ,   Social History     Tobacco Use    Smoking status: Never Smoker    Smokeless tobacco: Never Used   Substance Use Topics    Alcohol use: No     Alcohol/week: 0.0 standard drinks    Drug use: No   ,   Family History   Problem Relation Age of Onset    High Blood Pressure Mother     High Cholesterol Mother     Cancer Mother     Substance Abuse Father     Migraines Sister    ,   Immunization History   Administered Date(s) Administered    Influenza, Quadv, IM, (6 mo and older Fluzone, Flulaval, Fluarix and 3 yrs and older Afluria) 10/03/2016    Influenza, Quadv, IM, PF (6 mo and older Fluzone, Flulaval, Fluarix, and 3 yrs and older Afluria) 01/10/2018, 10/24/2018    Pneumococcal Polysaccharide ([id number]) 01/10/2018    Tdap (Boostrix, Psychiatric:       [x] Normal Affect [] No Hallucinations        [] Abnormal-     Most recent labs reviewed and discussed with patient. ASSESSMENT/PLAN:    1. Essential hypertension  2. PVC (premature ventricular contraction)  3. Abnormal stress test  -Stable: Con't all medications as ordered, con't f/u with cardiology as scheduled, con't current tx plan    4. Mild intermittent asthma without complication  -Stable: Con't all medications as ordered, con't current tx plan  -Will complete PFT after COVID-19 pandemic resolves    5. Type 2 diabetes mellitus with microalbuminuria, without long-term current use of insulin (HCC)  -Stable: Con't all medications as ordered, con't f/u with endocrinology as scheduled, con't current tx plan  -Discussed possible increasing metformin- patient would like to hold off on this at this time  -Lifestyle changes discussed and encouraged: Decrease fats, sugars, carbohydrates, and increase routine exercise    6. Multinodular goiter  -Stable: Con't all medications as ordered, con't f/u with endocrinology as scheduled, con't current tx plan  -Plan for FNA in the future    7. Left upper arm pain  -Unable to physically examine today  -Pain con't, patient believes may be a muscle strain  -Possible biceps tendinitis?  -She will con't stretches at home, consider PT after COVID-19 pandemic resolved  -Will get updated mammogram, as she is due, and will ensure pain is not r/t malignancy (low suspicion)    8. Breast cancer screening by mammogram  -Breast CA screening highly recommended, encouraged mammogram testing as soon as possible with yearly follow ups (after COVID pandemic)  - MIYA DIGITAL SCREEN W OR WO CAD BILATERAL; Future    Advised to send back [facility]. Return in about 3 months (around 7/2/2020) for routine follow up on chronic issues. Aby Hurtado is a 47 y.o. female being evaluated by a Virtual Visit (video visit) encounter to address concerns as mentioned above.   TED caregiver was present when appropriate. Due to this being a TeleHealth encounter (During XBBAQ-00 public health emergency), evaluation of the following organ systems was limited: Vitals/Constitutional/EENT/Resp/CV/GI//MS/Neuro/Skin/Heme-Lymph-Imm. Pursuant to the emergency declaration under the [address] [place] [address], [address] [place] [address] and the [employer] Act, this Virtual Visit was conducted with patient's (and/or legal guardian's) consent, to reduce the patient's risk of exposure to COVID-19 and provide necessary medical care. The patient (and/or legal guardian) has also been advised to contact this office for worsening conditions or problems, and seek emergency medical treatment and/or call 911 if deemed necessary. Services were provided through a video synchronous discussion virtually to substitute for in-person clinic visit. Patient and provider were located at their individual homes. 15-20 minutes were spent on the digital evaluation and management of this patient. --Immanuel Fermin, KAYLA - CNP on 4/2/2020 at 11:17 AM    An electronic signature was used to authenticate this note.

## 2020-04-02 NOTE — PROGRESS NOTES
Visit Information    Have you changed or started any medications since your last visit including any over-the-counter medicines, vitamins, or herbal medicines? no   Have you stopped taking any of your medications? Is so, why? -  no  Are you having any side effects from any of your medications? - no    Have you seen any other physician or provider since your last visit?  no   Have you had any other diagnostic tests since your last visit?  no   Have you been seen in the emergency room and/or had an admission in a hospital since we last saw you?  no   Have you had your routine dental cleaning in the past 6 months?  no     Do you have an active MyChart account? If no, what is the barrier?   Yes    Patient Care Team:  KAYLA Pizano CNP as PCP - General (Nurse Practitioner Family)  KAYLA Pizano CNP as PCP - [facility]  Zannie Ormond, MD (Endocrinology, Diabetes, & Metabolism)    Medical History Review  Past Medical, Family, and Social History reviewed and does contribute to the patient presenting condition    Health Maintenance   Topic Date Due    Hepatitis B vaccine (1 of 3 - Risk 3-dose series) 06/10/1988    Cervical cancer screen  06/15/2018    Colon cancer screen colonoscopy  06/10/2019    Shingles Vaccine (1 of 2) 02/27/2021 (Originally 6/10/2019)    Breast cancer screen  07/24/2020    Diabetic retinal exam  09/19/2020    Diabetic foot exam  02/27/2021    Lipid screen  03/03/2021    A1C test (Diabetic or Prediabetic)  03/18/2021    Potassium monitoring  03/18/2021    Creatinine monitoring  03/18/2021    DTaP/Tdap/Td vaccine (2 - Td) 01/24/2029    Flu vaccine  Completed    Pneumococcal 0-64 years Vaccine  Completed    HIV screen  Completed    Hepatitis A vaccine  Aged Out    Hib vaccine  Aged Out    Meningococcal (ACWY) vaccine  Aged Out

## 2020-04-07 RX ORDER — LANCETS 30 GAUGE
EACH MISCELLANEOUS
Qty: 100 EACH | Refills: 3 | Status: SHIPPED | OUTPATIENT
Start: 2020-04-07 | End: 2020-08-19

## 2020-05-04 ENCOUNTER — TELEPHONE (OUTPATIENT)
Dept: FAMILY MEDICINE CLINIC | Age: 50
End: 2020-05-04

## 2020-05-05 RX ORDER — CETIRIZINE HYDROCHLORIDE 10 MG/1
10 TABLET ORAL NIGHTLY PRN
Qty: 30 TABLET | Refills: 3 | OUTPATIENT
Start: 2020-05-05

## 2020-05-05 RX ORDER — BLOOD SUGAR DIAGNOSTIC
STRIP MISCELLANEOUS
Qty: 50 STRIP | Refills: 5 | Status: SHIPPED | OUTPATIENT
Start: 2020-05-05

## 2020-05-05 NOTE — TELEPHONE ENCOUNTER
THYROID Once 03/09/2020   Microalbumin, Ur Once 03/09/2020   MIYA DIGITAL SCREEN W OR WO CAD BILATERAL Once 06/02/2021               Patient Active Problem List:     Thyromegaly     Morbidly obese (HCC)     Anosmia     Hyperlipidemia, mixed     Chronic pansinusitis     Essential hypertension     Bilateral thoracic back pain     ANNIE (obstructive sleep apnea)     Anxiety and depression     Memory loss     Sleep disturbance     Mild intermittent asthma without complication     Seasonal allergic rhinitis     Goiter     Type 2 diabetes mellitus with microalbuminuria, without long-term current use of insulin ([facility])     Vitamin D deficiency     Microalbuminuria due to type 2 diabetes mellitus ([facility])     Numbness and tingling of left leg     Short-term memory loss     Chronic bilateral low back pain without sciatica     Lumbar degenerative disc disease     Thyroid with heterogeneous echotexture determined by ultrasound     Nodular thyroid disease     Left thyroid nodule     Right thyroid nodule     Proteinuria     CKD (chronic kidney disease) stage 1, GFR 90 ml/min or greater     Benign hypertension with CKD (chronic kidney disease) stage I     PVC (premature ventricular contraction)     Sinus arrhythmia     Multinodular goiter     Abnormal stress test

## 2020-05-06 ENCOUNTER — VIRTUAL VISIT (OUTPATIENT)
Dept: FAMILY MEDICINE CLINIC | Age: 50
End: 2020-05-06
Payer: MEDICARE

## 2020-05-06 PROCEDURE — 99213 OFFICE O/P EST LOW 20 MIN: CPT | Performed by: NURSE PRACTITIONER

## 2020-05-06 NOTE — PROGRESS NOTES
related appointment within my department in the past 7 days or scheduled within the next 24 hours.     Patient identification was verified at the start of the visit: Yes    Total Time: minutes: 11-20 minutes    Note: not billable if this call serves to triage the patient into an appointment for the relevant concern      Ruddy Dia

## 2020-05-13 ENCOUNTER — VIRTUAL VISIT (OUTPATIENT)
Dept: FAMILY MEDICINE CLINIC | Age: 50
End: 2020-05-13
Payer: MEDICARE

## 2020-05-13 PROCEDURE — 99213 OFFICE O/P EST LOW 20 MIN: CPT | Performed by: NURSE PRACTITIONER

## 2020-05-13 PROCEDURE — G8427 DOCREV CUR MEDS BY ELIG CLIN: HCPCS | Performed by: NURSE PRACTITIONER

## 2020-05-13 PROCEDURE — 3017F COLORECTAL CA SCREEN DOC REV: CPT | Performed by: NURSE PRACTITIONER

## 2020-05-13 NOTE — PROGRESS NOTES
500 MG tablet TAKE 1 TABLET BY MOUTH 2 TIMES DAILY AS NEEDED FOR PAIN Yes Winda Muzzy, APRN - CNP   mupirocin (BACTROBAN) 2 % cream APPLY TOPICALLY 3 TIMES DAILY.  Yes Winda Muzzy, APRN - CNP   fluticasone (FLONASE) 50 MCG/ACT nasal spray 2 sprays by Nasal route daily Yes Winda Muzzy, APRN - CNP       Social History     Tobacco Use    Smoking status: Never Smoker    Smokeless tobacco: Never Used   Substance Use Topics    Alcohol use: No     Alcohol/week: 0.0 standard drinks    Drug use: No        Allergies   Allergen Reactions    Codeine Other (See Comments)     hallucinations   ,   Past Medical History:   Diagnosis Date    Asthma     Benign hypertension with CKD (chronic kidney disease) stage I 1/4/2019    CKD (chronic kidney disease) stage 1, GFR 90 ml/min or greater 1/4/2019    Goiter     History of skull fracture 4/19/2017    Microalbuminuria due to type 2 diabetes mellitus ([facility] 1/11/2018    Morbid obesity ([facility]     Secondary diabetes mellitus with stage 1 chronic kidney disease ([facility] 1/4/2019   ,   Past Surgical History:   Procedure Laterality Date    CAUTERIZE INNER NOSE  08/31/2016    FRACTURE SURGERY      small toe on right foot  and left baby finger    SINUS SURGERY  08/31/2016    TONSILLECTOMY     ,   Social History     Tobacco Use    Smoking status: Never Smoker    Smokeless tobacco: Never Used   Substance Use Topics    Alcohol use: No     Alcohol/week: 0.0 standard drinks    Drug use: No   ,   Family History   Problem Relation Age of Onset    High Blood Pressure Mother     High Cholesterol Mother     Cancer Mother     Substance Abuse Father     Migraines Sister    ,   Immunization History   Administered Date(s) Administered    Influenza Virus Vaccine 10/24/2018    Influenza, Quadv, IM, (6 mo and older Fluzone, Flulaval, Fluarix and 3 yrs and older Afluria) 10/03/2016    Influenza, Quadv, IM, PF (6 mo and older Fluzone, Flulaval, Fluarix, and 3 yrs and older Afluria)

## 2020-06-26 ENCOUNTER — TELEPHONE (OUTPATIENT)
Dept: FAMILY MEDICINE CLINIC | Age: 50
End: 2020-06-26

## 2020-07-02 ENCOUNTER — TELEMEDICINE (OUTPATIENT)
Dept: FAMILY MEDICINE CLINIC | Age: 50
End: 2020-07-02
Payer: MEDICARE

## 2020-07-02 PROBLEM — M79.622 LEFT UPPER ARM PAIN: Status: ACTIVE | Noted: 2020-07-02

## 2020-07-02 PROBLEM — M25.512 LEFT SHOULDER PAIN: Status: ACTIVE | Noted: 2020-07-02

## 2020-07-02 PROCEDURE — 2022F DILAT RTA XM EVC RTNOPTHY: CPT | Performed by: NURSE PRACTITIONER

## 2020-07-02 PROCEDURE — G8427 DOCREV CUR MEDS BY ELIG CLIN: HCPCS | Performed by: NURSE PRACTITIONER

## 2020-07-02 PROCEDURE — 3017F COLORECTAL CA SCREEN DOC REV: CPT | Performed by: NURSE PRACTITIONER

## 2020-07-02 PROCEDURE — 3051F HG A1C>EQUAL 7.0%<8.0%: CPT | Performed by: NURSE PRACTITIONER

## 2020-07-02 PROCEDURE — 99213 OFFICE O/P EST LOW 20 MIN: CPT | Performed by: NURSE PRACTITIONER

## 2020-07-02 ASSESSMENT — ENCOUNTER SYMPTOMS
WHEEZING: 0
SHORTNESS OF BREATH: 0
COUGH: 0

## 2020-07-02 NOTE — PROGRESS NOTES
COMPARISON:   None.       HISTORY:   ORDERING SYSTEM PROVIDED HISTORY: Left upper arm pain   TECHNOLOGIST PROVIDED HISTORY:   Left UA pain   Reason for Exam: Pt states distal left humerus pain. 1 week   Acuity: Unknown   Type of Exam: Unknown       FINDINGS:   There is no acute osseous abnormality.  The left shoulder and left elbow   joint spaces are maintained.  The surrounding soft tissues are unremarkable.           Impression   No acute osseous or soft tissue abnormality. Review of Systems   Constitutional: Negative for chills and fever. Respiratory: Negative for cough, shortness of breath and wheezing. Cardiovascular: Negative for chest pain and palpitations. Musculoskeletal: Positive for arthralgias and myalgias. Prior to Visit Medications    Medication Sig Taking?  Authorizing Provider   lisinopril (PRINIVIL;ZESTRIL) 2.5 MG tablet TAKE 1 TABLET DAILY Yes KAYLA Pleitez CNP   metFORMIN (GLUCOPHAGE-XR) 750 MG extended release tablet TAKE 1 TABLET TWICE A DAY Yes KAYLA Pleitez CNP   atorvastatin (LIPITOR) 10 MG tablet TAKE 1 TABLET BY MOUTH NIGHTLY Yes KAYLA Pleitez CNP   magnesium oxide (MAG-OX) 400 (241.3 Mg) MG TABS tablet TAKE 1 TABLET BY MOUTH DAILY Yes Saritha Trent MD   [facility] VERIO strip USE AS DIRECTED DAILY Yes Rachell العلي MD   Lancets (ONETOUCH DELICA PLUS [id number]) MISC USE AS DIRECTED DAILY Yes KAYLA Pleitez CNP   Alcohol Swabs (PRO COMFORT ALCOHOL) 70 % PADS USE AS DIRECTED DAILY Yes KAYLA Pleitez CNP   albuterol sulfate  (90 Base) MCG/ACT inhaler TAKE 1-2 PUFFS EVERY 4 HOURS AS NEEDED FOR FOR SHORTNESS OF BREATH (SPACE OUT TO EVERY 6 HOURS AS SYMPTOMS IMPROVE) Yes KAYLA Pleitez CNP   ASPIRIN LOW DOSE 81 MG EC tablet TAKE 1 TABLET BY MOUTH DAILY Yes KAYLA Pleitez CNP   Respiratory Therapy Supplies (NEBULIZER/TUBING/MOUTHPIECE) KIT 1 kit by Does not apply route every 4 hours as needed (Use as needed w/DuoNeb's for wheezing/SOB) Yes Sallyann Starch, APRN - CNP   cetirizine (ZYRTEC) 10 MG tablet TAKE 1 TABLET BY MOUTH NIGHTLY AS NEEDED FOR ALLERGIES OR RHINITIS Yes Sallyann Starch, APRN - CNP   Cholecalciferol (VITAMIN D3) 1000 units TABS TAKE 1 TABLET BY MOUTH DAILY Yes Sallyann Starch, APRN - CNP   ipratropium-albuterol (DUONEB) 0.5-2.5 (3) MG/3ML SOLN nebulizer solution USE 1 VIAL IN NEBULIZER EVERY FOUR HOURS AS NEEDED FOR SHORTNESS OF BREATH (WHEEZING) Yes Javan Martinez MD   naproxen (NAPROSYN) 500 MG tablet TAKE 1 TABLET BY MOUTH 2 TIMES DAILY AS NEEDED FOR PAIN Yes Sallyann Starch, APRN - CNP   mupirocin (BACTROBAN) 2 % cream APPLY TOPICALLY 3 TIMES DAILY.  Yes Sallyann Starch, APRN - CNP   fluticasone (FLONASE) 50 MCG/ACT nasal spray 2 sprays by Nasal route daily Yes Sallyann Starch, APRN - CNP       Social History     Tobacco Use    Smoking status: Never Smoker    Smokeless tobacco: Never Used   Substance Use Topics    Alcohol use: No     Alcohol/week: 0.0 standard drinks    Drug use: No      Allergies   Allergen Reactions    Codeine Other (See Comments)     hallucinations   ,   Past Medical History:   Diagnosis Date    Asthma     Benign hypertension with CKD (chronic kidney disease) stage I 1/4/2019    CKD (chronic kidney disease) stage 1, GFR 90 ml/min or greater 1/4/2019    Goiter     History of skull fracture 4/19/2017    Microalbuminuria due to type 2 diabetes mellitus ([facility] [address] 1/11/2018    Morbid obesity ([facility] [facility]     Secondary diabetes mellitus with stage 1 chronic kidney disease ([facility] [address] 1/4/2019   ,   Past Surgical History:   Procedure Laterality Date    CAUTERIZE INNER NOSE  08/31/2016    FRACTURE SURGERY      small toe on right foot  and left baby finger    SINUS SURGERY  08/31/2016    TONSILLECTOMY     ,   Social History     Tobacco Use    Smoking status: Never Smoker    Smokeless tobacco: Never Used   Substance Use Topics    Alcohol use: No     Alcohol/week: 0.0 standard drinks    Drug use: No   ,   Family History   Problem Relation Age of Onset    High Blood Pressure Mother     High Cholesterol Mother     Cancer Mother     Substance Abuse Father     Migraines Sister    ,   Immunization History   Administered Date(s) Administered    Influenza Virus Vaccine 10/24/2018    Influenza, Leartis Shashank, IM, (6 mo and older Fluzone, Flulaval, Fluarix and 3 yrs and older Afluria) 10/03/2016    Influenza, Quadv, IM, PF (6 mo and older Fluzone, Flulaval, Fluarix, and 3 yrs and older Afluria) 01/10/2018, 10/24/2018, 03/03/2020    Pneumococcal Polysaccharide ([id number]) 01/10/2018    Tdap (Boostrix, Adacel) 01/24/2019   ,   Health Maintenance   Topic Date Due    Hepatitis B vaccine (1 of 3 - Risk 3-dose series) 06[birth year]10/1988    Cervical cancer screen  06/15/2018    Colon cancer screen colonoscopy  06/10/2019    Breast cancer screen  07/24/2020    Shingles Vaccine (1 of 2) 02/27/2021 (Originally 6/10/2019)    Flu vaccine (1) 09/01/2020    Diabetic retinal exam  09/19/2020    Diabetic foot exam  02/27/2021    Lipid screen  03/03/2021    A1C test (Diabetic or Prediabetic)  03/18/2021    Potassium monitoring  03/18/2021    Creatinine monitoring  03/18/2021    DTaP/Tdap/Td vaccine (2 - Td) 01/24/2029    Pneumococcal 0-64 years Vaccine  Completed    HIV screen  Completed    Hepatitis A vaccine  Aged Out    Hib vaccine  Aged Out    Meningococcal (ACWY) vaccine  Aged Out     PHYSICAL EXAMINATION:  [ INSTRUCTIONS:  \"[x]\" Indicates a positive item  \"[]\" Indicates a negative item  -- DELETE ALL ITEMS NOT EXAMINED]  Vital Signs: Not completed due to virtual visit.     Constitutional: [x] Appears well-developed and well-nourished [x] No apparent distress      [] Abnormal-   Mental status  [x] Alert and awake  [x] Oriented to person/place/time [x]Able to follow commands      Eyes:  EOM    [x]  Normal  [] Abnormal-  Sclera  [x]  Normal  [] Abnormal -         Discharge [x]  None visible  [] Abnormal -    HENT:   [x] Normocephalic, atraumatic. [] Abnormal   [x] Mouth/Throat: Mucous membranes are moist.     External Ears [x] Normal  [] Abnormal-     Neck: [x] No visualized mass     Pulmonary/Chest: [x] Respiratory effort normal.  [x] No visualized signs of difficulty breathing or respiratory distress        [] Abnormal-     Neurological:        [x] No Facial Asymmetry (Cranial nerve 7 motor function) (limited exam to video visit)          [x] No gaze palsy        [] Abnormal-         Skin:        [x] No significant exanthematous lesions or discoloration noted on facial skin         [] Abnormal-            Psychiatric:       [x] Normal Affect [x] No Hallucinations        [] Abnormal-     Other pertinent observable physical exam findings- Patient appears generally well, is speaking full sentences clearly without any observable SOB, no cough, no diaphoresis. ASSESSMENT/PLAN:  1. Essential hypertension  -Stable: Con't all medications as ordered, con't current tx plan    2. Abnormal stress test  -I did stress to her the need to follow up with cardiology, especially w/complaints of left arm pain, does not seem to be r/t cardiac etiology, but does need follow up regardless- advised to seek emergent tx for any severe left arm/chest pain at any time    3. Type 2 diabetes mellitus with microalbuminuria, without long-term current use of insulin (HCC)  -Stable: Con't all medications as ordered, con't current tx plan  -Will need to check HgbA1c at next month's appointment either in person or will send to lab    4. Mild intermittent asthma without complication  -Stable: Con't all medications as ordered, con't current tx plan    5. Nodular thyroid disease  -Stable: Con't all medications as ordered, con't f/u with endocrinology as scheduled, con't current tx plan  -Needs to complete bx as ordered    6. Left upper arm pain  7.  Left shoulder pain, unspecified chronicity  -I am suspicious for possible shoulder tear, will proceed with XR shoulder, may need to add MRI in the future, declined PT at this time, con't stretches at home, call if s/s worsen/change  - XR SHOULDER LEFT (MIN 2 VIEWS); Future  -Needs to be examined in person- recommended in person f/u for next appointment- this was discussed previously 4-2-20    8. Mass of right hand  -Resolved per patient    Advised to complete all testing as previously ordered. Return in about 1 month (around 8/2/2020) for routine follow up on chronic issues. Josselyn Thompson is a 45 y.o. female being evaluated by a Virtual Visit (video visit) encounter to address concerns as mentioned above. A caregiver was present when appropriate. Due to this being a TeleHealth encounter ([facility]), evaluation of the following organ systems was limited: Vitals/Constitutional/EENT/Resp/CV/GI//MS/Neuro/Skin/Heme-Lymph-Imm. Pursuant to the emergency declaration under the [address] [place] [address], [address] [place] [address] authority and the [employer] and [employer] Act, this Virtual Visit was conducted with patient's (and/or legal guardian's) consent, to reduce the patient's risk of exposure to COVID-19 and provide necessary medical care. The patient (and/or legal guardian) has also been advised to contact this office for worsening conditions or problems, and seek emergency medical treatment and/or call 911 if deemed necessary. Patient identification was verified at the start of the visit: Yes    Total time spent on this encounter:   11-20 minutes were spent on the digital evaluation and management of this patient. Services were provided through a video synchronous discussion virtually to substitute for in-person clinic visit. Patient and provider were located at their individual homes. --KAYLA Campbell - CNP on 7/2/2020 at 9:33 AM    An electronic signature was used to authenticate this note.

## 2020-07-02 NOTE — PROGRESS NOTES
Visit Information    Have you changed or started any medications since your last visit including any over-the-counter medicines, vitamins, or herbal medicines? no   Have you stopped taking any of your medications? Is so, why? -  no  Are you having any side effects from any of your medications? - no    Have you seen any other physician or provider since your last visit?  no   Have you had any other diagnostic tests since your last visit?  no   Have you been seen in the emergency room and/or had an admission in a hospital since we last saw you?  no   Have you had your routine dental cleaning in the past 6 months?  no     Do you have an active MyChart account? If no, what is the barrier?   Yes    Patient Care Team:  KAYLA Beckett CNP as PCP - General (Nurse Practitioner Family)  KAYLA Beckett CNP as PCP - [facility] Provider  Seth Montemayor MD (Endocrinology, Diabetes, & Metabolism)    Medical History Review  Past Medical, Family, and Social History reviewed and does contribute to the patient presenting condition    Health Maintenance   Topic Date Due    Hepatitis B vaccine (1 of 3 - Risk 3-dose series) 06/10/1988    Cervical cancer screen  06/15/2018    Colon cancer screen colonoscopy  06/10/2019    Breast cancer screen  07/24/2020    Shingles Vaccine (1 of 2) 02/27/2021 (Originally 6/10/2019)    Flu vaccine (1) 09/01/2020    Diabetic retinal exam  09/19/2020    Diabetic foot exam  02/27/2021    Lipid screen  03/03/2021    A1C test (Diabetic or Prediabetic)  03/18/2021    Potassium monitoring  03/18/2021    Creatinine monitoring  03/18/2021    DTaP/Tdap/Td vaccine (2 - Td) 01/24/2029    Pneumococcal 0-64 years Vaccine  Completed    HIV screen  Completed    Hepatitis A vaccine  Aged Out    Hib vaccine  Aged Out    Meningococcal (ACWY) vaccine  Aged Out

## 2020-08-19 RX ORDER — PEN NEEDLE, DIABETIC 32GX 5/32"
NEEDLE, DISPOSABLE MISCELLANEOUS
Qty: 100 EACH | Refills: 3 | Status: SHIPPED | OUTPATIENT
Start: 2020-08-19

## 2020-08-19 RX ORDER — LANCETS 30 GAUGE
EACH MISCELLANEOUS
Qty: 100 EACH | Refills: 3 | Status: SHIPPED | OUTPATIENT
Start: 2020-08-19

## 2020-09-30 ENCOUNTER — TELEMEDICINE (OUTPATIENT)
Dept: NEUROLOGY | Age: 50
End: 2020-09-30
Payer: MEDICARE

## 2020-09-30 PROCEDURE — 2022F DILAT RTA XM EVC RTNOPTHY: CPT | Performed by: PSYCHIATRY & NEUROLOGY

## 2020-09-30 PROCEDURE — 3017F COLORECTAL CA SCREEN DOC REV: CPT | Performed by: PSYCHIATRY & NEUROLOGY

## 2020-09-30 PROCEDURE — G8427 DOCREV CUR MEDS BY ELIG CLIN: HCPCS | Performed by: PSYCHIATRY & NEUROLOGY

## 2020-09-30 PROCEDURE — 99214 OFFICE O/P EST MOD 30 MIN: CPT | Performed by: PSYCHIATRY & NEUROLOGY

## 2020-09-30 RX ORDER — FLUTICASONE PROPIONATE 50 MCG
1 SPRAY, SUSPENSION (ML) NASAL DAILY
Qty: 2 BOTTLE | Refills: 1 | Status: SHIPPED | OUTPATIENT
Start: 2020-09-30

## 2020-09-30 NOTE — PROGRESS NOTES
Days per week: Not on file     Minutes per session: Not on file    Stress: Not on file   Relationships    Social connections     Talks on phone: Not on file     Gets together: Not on file     Attends Mosque service: Not on file     Active member of club or organization: Not on file     Attends meetings of clubs or organizations: Not on file     Relationship status: Not on file    Intimate partner violence     Fear of current or ex partner: Not on file     Emotionally abused: Not on file     Physically abused: Not on file     Forced sexual activity: Not on file   Other Topics Concern    Not on file   Social History Narrative    Not on file       CURRENT MEDICATIONS:     Current Outpatient Medications   Medication Sig Dispense Refill    fluticasone (FLONASE) 50 MCG/ACT nasal spray 1 spray by Each Nostril route daily 2 Bottle 1    Alcohol Swabs (PRO COMFORT ALCOHOL) 70 % PADS USE AS DIRECTED DAILY 100 each 3    Lancets (ONETOUCH DELICA PLUS [id number]) MISC USE AS DIRECTED DAILY 100 each 3    ipratropium-albuterol (DUONEB) 0.5-2.5 (3) MG/3ML SOLN nebulizer solution USE 1 VIAL IN NEBULIZER EVERY FOUR HOURS AS NEEDED FOR SHORTNESS OF BREATH (WHEEZING) 360 mL 0    lisinopril (PRINIVIL;ZESTRIL) 2.5 MG tablet TAKE 1 TABLET DAILY 90 tablet 1    metFORMIN (GLUCOPHAGE-XR) 750 MG extended release tablet TAKE 1 TABLET TWICE A  tablet 1    atorvastatin (LIPITOR) 10 MG tablet TAKE 1 TABLET BY MOUTH NIGHTLY 90 tablet 1    magnesium oxide (MAG-OX) 400 (241.3 Mg) MG TABS tablet TAKE 1 TABLET BY MOUTH DAILY 30 tablet 1    ONETOUCH VERIO strip USE AS DIRECTED DAILY 50 strip 5    albuterol sulfate  (90 Base) MCG/ACT inhaler TAKE 1-2 PUFFS EVERY 4 HOURS AS NEEDED FOR FOR SHORTNESS OF BREATH (SPACE OUT TO EVERY 6 HOURS AS SYMPTOMS IMPROVE) 18 g 3    ASPIRIN LOW DOSE 81 MG EC tablet TAKE 1 TABLET BY MOUTH DAILY 90 tablet 3    Respiratory Therapy Supplies (NEBULIZER/TUBING/MOUTHPIECE) KIT 1 kit by Does not apply route every 4 hours as needed (Use as needed w/DuoNeb's for wheezing/SOB) 1 kit 0    cetirizine (ZYRTEC) 10 MG tablet TAKE 1 TABLET BY MOUTH NIGHTLY AS NEEDED FOR ALLERGIES OR RHINITIS 30 tablet 3    Cholecalciferol (VITAMIN D3) 1000 units TABS TAKE 1 TABLET BY MOUTH DAILY 30 tablet 11    mupirocin (BACTROBAN) 2 % cream APPLY TOPICALLY 3 TIMES DAILY. 30 g 0    naproxen (NAPROSYN) 500 MG tablet TAKE 1 TABLET BY MOUTH 2 TIMES DAILY AS NEEDED FOR PAIN (Patient not taking: Reported on 9/30/2020) 60 tablet 0    fluticasone (FLONASE) 50 MCG/ACT nasal spray 2 sprays by Nasal route daily (Patient not taking: Reported on 9/30/2020) 1 Bottle 0     No current facility-administered medications for this visit. ALLERGIES:     Allergies   Allergen Reactions    Codeine Other (See Comments)     hallucinations                             REVIEW OF SYSTEMS     All items selected indicate a positive finding. Those items not selected are negative.   Constitutional [] Weight loss/gain   [] Fatigue  [] Fever/Chills   HEENT [] Hearing Loss  [] Visual Disturbance  [] Tinnitus  [] Eye pain   Respiratory [] Shortness of Breath  [] Cough  [] Snoring   Cardiovascular [] Chest Pain  [] Palpitations  [] Lightheaded   GI [] Constipation  [] Diarrhea  [] Swallowing change    [] Urinary Frequency  [] Urinary Urgency   Musculoskeletal [] Neck pain  [] Back pain  [] Muscle pain  [] Restless legs   Dermatologic [] Skin changes   Neurologic [] Memory loss/confusion  [] Seizures  [] Trouble walking or imbalance  [] Dizziness  [] Weakness  [] Numbness  [] Tremors  [] Speech Difficulty  [] Headaches  [] Light Sensitivity  [] Sound Sensitivity   Endocrinology []Excessive thirst  []Excessive hunger   Psychiatric [] Anxiety/Depression  [] Hallucination   Allergy/immunology []Hives/environmental allergies   Hematologic/lymph [] Abnormal bleeding  [] Abnormal bruising           PHYSICAL EXAMINATION: .                                                                                                    General Appearance:  Alert, cooperative, no signs of distress, appears stated age   Head:  Normocephalic, no signs of trauma   Eyes:  Conjunctiva/corneas clear;  eyelids intact   Ears:  Normal external ear and canals   Nose: Nares normal, no drainage    Throat: Lips and tongue normal; teeth normal;  gums normal   Extremities: Extremities normal, no cyanosis, no edema   Skin: Skin color, texture normal, no rashes, no lesions                                     NEUROLOGIC EXAMINATION      Mental status    Alert and oriented x 3; able to follow commands, speech and language intact; no hallucinations or delusions  Fund of information appropriate for level of education    Cranial nerves    II - grossly intact  III, IV, VI - extra-ocular muscles full: no nystagmus, no ptosis   V - normal facial sensation                                                               VII - normal facial symmetry                                                             VIII - intact hearing                                                                             IX, X - symmetrical palate                                                                  XI - symmetrical shoulder shrug                                                       XII - tongue midline without atrophy      Motor function  Normal muscle bulk. Strength at least 4+/5 on all 4 extremities, no pronator drift      Sensory function Unable to test      Cerebellar Intact fine motor movement. No involuntary movements or tremors. No ataxia or dysmetria on finger to nose or heel to shin testing      Reflex function Unable to test      Gait                   normal base and arm swing              ASSESSMENT AND PLAN:       This is a 45 y.o. female who comes in for follow up for ANNIE on CPAP, and memory loss secondary to depression.   She continues to have significant personal stress due to her family situation and her elderly mother, that has disrupted her sleep in her routine. She has not been using CPAP regularly or taking her medications as scheduled. Advised patient to try to log her days and set up a routine so that she can incorporate CPAP use and medication intake to her schedule again. She reports she has also been setting boundaries with her family to try to decrease her stress, I encouraged her to continue doing so. She is also planning on exercising on a regular basis, advised her that this can help with her depression as well. We will reassess her again in 3 months. Patient ID verified by me prior to start of this visit    This is a telehealth visit that was performed with the originating site at Patient Location: home and Provider Location of [place], [place]. Verbal consent to participate in video visit was obtained. Pursuant to the emergency declaration under the [facility] waiver authority and the [employer] Act, this Virtual Visit was conducted, with patient's consent, to reduce the patient's risk of exposure to COVID-19 and provide continuity of care for an established/new patient. Services were provided through a video synchronous discussion virtually to substitute for in-person clinic visit. I discussed with the patient the nature of our telehealth visits via interactive/real-time audio/video that:  - I would evaluate the patient and recommend diagnostics and treatments based on my assessment  - Our sessions are not being recorded and that personal health information is protected  - Our team would provide follow up care in person if/when the patient needs it. Krystina Fischer MD  Neurology and Sleep Medicine  [address].    Please note that this chart was generated using voice recognition Dragon dictation software.   Although every

## 2020-11-17 RX ORDER — LISINOPRIL 2.5 MG/1
TABLET ORAL
Qty: 30 TABLET | Refills: 0 | Status: SHIPPED | OUTPATIENT
Start: 2020-11-17 | End: 2020-12-15 | Stop reason: SDUPTHER

## 2020-11-24 ENCOUNTER — TELEPHONE (OUTPATIENT)
Dept: FAMILY MEDICINE CLINIC | Age: 50
End: 2020-11-24

## 2020-11-24 NOTE — TELEPHONE ENCOUNTER
Called and spoke with patient, reminded her that Cologuard was sent and to complete/send back by 2/26/21.

## 2020-12-15 RX ORDER — LISINOPRIL 2.5 MG/1
TABLET ORAL
Qty: 30 TABLET | Refills: 0 | Status: SHIPPED | OUTPATIENT
Start: 2020-12-15

## 2021-01-20 ENCOUNTER — TELEMEDICINE (OUTPATIENT)
Dept: NEUROLOGY | Age: 51
End: 2021-01-20
Payer: MEDICARE

## 2021-01-20 DIAGNOSIS — E11.42 DIABETIC POLYNEUROPATHY ASSOCIATED WITH TYPE 2 DIABETES MELLITUS (HCC): ICD-10-CM

## 2021-01-20 DIAGNOSIS — R41.3 MEMORY LOSS: ICD-10-CM

## 2021-01-20 DIAGNOSIS — G47.33 OSA (OBSTRUCTIVE SLEEP APNEA): Primary | ICD-10-CM

## 2021-01-20 PROCEDURE — G8427 DOCREV CUR MEDS BY ELIG CLIN: HCPCS | Performed by: PSYCHIATRY & NEUROLOGY

## 2021-01-20 PROCEDURE — 99214 OFFICE O/P EST MOD 30 MIN: CPT | Performed by: PSYCHIATRY & NEUROLOGY

## 2021-01-20 PROCEDURE — 3046F HEMOGLOBIN A1C LEVEL >9.0%: CPT | Performed by: PSYCHIATRY & NEUROLOGY

## 2021-01-20 PROCEDURE — 2022F DILAT RTA XM EVC RTNOPTHY: CPT | Performed by: PSYCHIATRY & NEUROLOGY

## 2021-01-20 PROCEDURE — 3017F COLORECTAL CA SCREEN DOC REV: CPT | Performed by: PSYCHIATRY & NEUROLOGY

## 2021-01-20 NOTE — PROGRESS NOTES
[facility]  Offices: [address], [place], [address] [facility]  [address] [facility], [address], [place], [address] [facility]  [address] [facility] [address], [address]  Phone: [phone]  Fax: [phone]    MD Mary Anne Harrison, MD Santa Jay MD Bernadette Spark, MD Marja Sequin, CNP    TELEHEALTH VISIT        1/20/2021      HISTORY OF PRESENT ILLNESS:       I had the pleasure of seeing Rojas Sultana, who returns for continuing neurologic care for sleep apnea, memory loss (onset in 2017), mostly attentional issues secondary to depression as seen on neuropsychological testing. She also has sensory polyneuropathy likely secondary to diabetes but currently not symptomatic. Unfortunately, patient reports she continues to struggle with her sleep schedule, CPAP use and medication intake. She is currently taking care of her mother, and also has been under a lot of stress because of personal issues with her son and his drug use. She reports a very inconsistent and sporadic sleep schedule. She will often fall asleep unintentionally in her couch in the early evenings, and will then be unable to sleep the rest of the night. She does not take any daytime naps, and will go for about 2 to 3 days without sleep, after which she will crash and sleep all day. Because of her inconsistent sleep-wake schedule, she has been unable to take her medications on a regular basis. She has been trying to get back on CPAP, but reports she is only been able to use it for a few nights a month. She thinks she may have also started grinding her teeth in her sleep because of the significant personal stress she is currently dealing with. Her PCP also recently left the practice and she is trying to establish with a new nurse practitioner in the office. However, she does not want to go for an in person visit because she is living with her elderly mother. She denies any other new symptoms, no recent changes in medications. Prior testing reviewed:  MRI brain with and without contrast 11/12/18: Acute sinusitis otherwise negative brain  Neuropsychological testing [facility]: No evidence of cognitive dysfunction, testing results were actually above average in many domains. EMG 8/29/18: There is electrophysiologic evidence of sensory neuropathy with predominant demyelinating features. This study did not demonstrate any electrodiagnostic evidence of lumbosacral radiculopathy or plexopathy in the nerves and muscles tested.  Clinical correlation is recommended. Noncontrast head CT June 2018:  Negative CT brain with no acute intracranial abnormality.  Incidental   bilateral sphenoid sinusitis as described.        PAST MEDICAL HISTORY:         Diagnosis Date    Asthma     Benign hypertension with CKD (chronic kidney disease) stage I 1/4/2019    CKD (chronic kidney disease) stage 1, GFR 90 ml/min or greater 1/4/2019    Goiter     History of skull fracture 4/19/2017    Microalbuminuria due to type 2 diabetes mellitus ([facility] [address] 1/11/2018    Morbid obesity ([facility] [address]     Secondary diabetes mellitus with stage 1 chronic kidney disease ([facility] [address] 1/4/2019        PAST SURGICAL HISTORY:         Procedure Laterality Date    CAUTERIZE INNER NOSE  08/31/2016    FRACTURE SURGERY      small toe on right foot  and left baby finger    SINUS SURGERY  08/31/2016    TONSILLECTOMY          SOCIAL HISTORY:     Social History     Socioeconomic History    Marital status: Single     Spouse name: Not on file    Number of children: Not on file    Years of education: Not on file    Highest education level: Not on file   Occupational History    Not on file   Social Needs    Financial resource strain: Not on file    Food insecurity     Worry: Not on file     Inability: Not on file    Transportation needs     Medical: Not on file     Non-medical: Not on file   Tobacco Use  Smoking status: Never Smoker    Smokeless tobacco: Never Used   Substance and Sexual Activity    Alcohol use: No     Alcohol/week: 0.0 standard drinks    Drug use: No    Sexual activity: Not Currently     Partners: Male     Comment: no current partner   Lifestyle    Physical activity     Days per week: Not on file     Minutes per session: Not on file    Stress: Not on file   Relationships    Social connections     Talks on phone: Not on file     Gets together: Not on file     Attends Anabaptist service: Not on file     Active member of club or organization: Not on file     Attends meetings of clubs or organizations: Not on file     Relationship status: Not on file    Intimate partner violence     Fear of current or ex partner: Not on file     Emotionally abused: Not on file     Physically abused: Not on file     Forced sexual activity: Not on file   Other Topics Concern    Not on file   Social History Narrative    Not on file       CURRENT MEDICATIONS:     Current Outpatient Medications   Medication Sig Dispense Refill    magnesium oxide (MAG-OX) 400 (241.3 Mg) MG TABS tablet TAKE 1 TABLET BY MOUTH DAILY 30 tablet 1    lisinopril (PRINIVIL;ZESTRIL) 2.5 MG tablet TAKE 1 TABLET DAILY 30 tablet 0    fluticasone (FLONASE) 50 MCG/ACT nasal spray 1 spray by Each Nostril route daily 2 Bottle 1    Alcohol Swabs (PRO COMFORT ALCOHOL) 70 % PADS USE AS DIRECTED DAILY 100 each 3    Lancets (ONETOUCH DELICA PLUS [id number]) MISC USE AS DIRECTED DAILY 100 each 3    ipratropium-albuterol (DUONEB) 0.5-2.5 (3) MG/3ML SOLN nebulizer solution USE 1 VIAL IN NEBULIZER EVERY FOUR HOURS AS NEEDED FOR SHORTNESS OF BREATH (WHEEZING) 360 mL 0    metFORMIN (GLUCOPHAGE-XR) 750 MG extended release tablet TAKE 1 TABLET TWICE A  tablet 1    atorvastatin (LIPITOR) 10 MG tablet TAKE 1 TABLET BY MOUTH NIGHTLY 90 tablet 1    ONETOUCH VERIO strip USE AS DIRECTED DAILY 50 strip 5  albuterol sulfate  (90 Base) MCG/ACT inhaler TAKE 1-2 PUFFS EVERY 4 HOURS AS NEEDED FOR FOR SHORTNESS OF BREATH (SPACE OUT TO EVERY 6 HOURS AS SYMPTOMS IMPROVE) 18 g 3    ASPIRIN LOW DOSE 81 MG EC tablet TAKE 1 TABLET BY MOUTH DAILY 90 tablet 3    Respiratory Therapy Supplies (NEBULIZER/TUBING/MOUTHPIECE) KIT 1 kit by Does not apply route every 4 hours as needed (Use as needed w/DuoNeb's for wheezing/SOB) 1 kit 0    cetirizine (ZYRTEC) 10 MG tablet TAKE 1 TABLET BY MOUTH NIGHTLY AS NEEDED FOR ALLERGIES OR RHINITIS 30 tablet 3    Cholecalciferol (VITAMIN D3) 1000 units TABS TAKE 1 TABLET BY MOUTH DAILY 30 tablet 11    mupirocin (BACTROBAN) 2 % cream APPLY TOPICALLY 3 TIMES DAILY. 30 g 0    fluticasone (FLONASE) 50 MCG/ACT nasal spray 2 sprays by Nasal route daily 1 Bottle 0    naproxen (NAPROSYN) 500 MG tablet TAKE 1 TABLET BY MOUTH 2 TIMES DAILY AS NEEDED FOR PAIN (Patient not taking: Reported on 9/30/2020) 60 tablet 0     No current facility-administered medications for this visit.          ALLERGIES:     Allergies   Allergen Reactions    Codeine Other (See Comments)     hallucinations                             REVIEW OF SYSTEMS    10 point review of systems unremarkable other than for those mentioned above    PHYSICAL EXAMINATION:                                         .                                                                                                    General Appearance:  Alert, cooperative, no signs of distress, appears stated age   Head:  Normocephalic, no signs of trauma   Eyes:  Conjunctiva/corneas clear;  eyelids intact   Ears:  Normal external ear and canals   Nose: Nares normal, no drainage    Throat: Lips and tongue normal; teeth normal;  gums normal   Extremities: Extremities normal, no cyanosis, no edema   Skin: Skin color, texture normal, no rashes, no lesions                                     NEUROLOGIC EXAMINATION      Mental status Alert and oriented x 3; able to follow commands, speech and language intact; no hallucinations or delusions  Fund of information appropriate for level of education    Cranial nerves    II - grossly intact  III, IV, VI  extra-ocular muscles full: no nystagmus, no ptosis   V - normal facial sensation                                                               VII - normal facial symmetry                                                             VIII - intact hearing                                                                             IX, X - symmetrical palate                                                                  XI - symmetrical shoulder shrug                                                       XII - tongue midline without atrophy      Motor function  Normal muscle bulk. Strength at least 4+/5 on all 4 extremities, no pronator drift      Sensory function Unable to test      Cerebellar Intact fine motor movement. No involuntary movements or tremors. No ataxia or dysmetria on finger to nose or heel to shin testing      Reflex function Unable to test      Gait                   normal base and arm swing              ASSESSMENT AND PLAN:       This is a 45 y.o. female who comes in for follow up for a history of ANNIE on CPAP, and memory loss secondary to depression. Unfortunately, all her symptoms are compounded by an irregular sleep-wake schedule, secondary to personal stress in her home situation. I advised the patient that she needs to prioritize getting on a schedule, since improving her sleep will have a lot of positive downstream effects. Advised her to set a fixed bedtime schedule, and to consider using a white noise eliot at night to minimize any distractions that may wake her up while sleeping. I also did a courtesy refill of her magnesium oxide since she has been unable to see her PCP recently. We will reassess her again in 6 months. 35 minutes was spent in this patient visit today. Patient ID verified by me prior to start of this visit    This is a telehealth visit that was performed with the originating site at Patient Location: home and Provider Location of [place], [place]. Verbal consent to participate in video visit was obtained. Pursuant to the emergency declaration under the [address] [place] [address] waiver authority and the [employer] Act, this Virtual Visit was conducted, with patient's consent, to reduce the patient's risk of exposure to COVID-19 and provide continuity of care for an established/new patient. Services were provided through a video synchronous discussion virtually to substitute for in-person clinic visit. I discussed with the patient the nature of our telehealth visits via interactive/real-time audio/video that:  - I would evaluate the patient and recommend diagnostics and treatments based on my assessment  - Our sessions are not being recorded and that personal health information is protected  - Our team would provide follow up care in person if/when the patient needs it. Malissa Fu MD  Neurology and Sleep Medicine  [facility].    Please note that this chart was generated using voice recognition Dragon dictation software. Although every effort was made to ensure the accuracy of this automated transcription, some errors in transcription may have occurred.

## 2022-05-02 ENCOUNTER — APPOINTMENT (OUTPATIENT)
Dept: GENERAL RADIOLOGY | Age: 52
End: 2022-05-02
Payer: MEDICARE

## 2022-05-02 ENCOUNTER — HOSPITAL ENCOUNTER (EMERGENCY)
Age: 52
Discharge: HOME | End: 2022-05-02
Attending: EMERGENCY MEDICINE
Payer: MEDICARE

## 2022-05-02 VITALS
TEMPERATURE: 97.5 F | HEIGHT: 68 IN | SYSTOLIC BLOOD PRESSURE: 157 MMHG | DIASTOLIC BLOOD PRESSURE: 81 MMHG | OXYGEN SATURATION: 98 % | WEIGHT: 255 LBS | HEART RATE: 85 BPM | RESPIRATION RATE: 16 BRPM | BODY MASS INDEX: 38.65 KG/M2

## 2022-05-02 DIAGNOSIS — S70.02XA CONTUSION OF LEFT HIP, INITIAL ENCOUNTER: ICD-10-CM

## 2022-05-02 DIAGNOSIS — M77.11 RIGHT TENNIS ELBOW: ICD-10-CM

## 2022-05-02 DIAGNOSIS — S93.402A SPRAIN OF LEFT ANKLE, UNSPECIFIED LIGAMENT, INITIAL ENCOUNTER: ICD-10-CM

## 2022-05-02 DIAGNOSIS — S20.212A CONTUSION OF LEFT CHEST WALL, INITIAL ENCOUNTER: ICD-10-CM
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
## 2022-05-02 PROCEDURE — 6370000000 HC RX 637 (ALT 250 FOR IP): Performed by: EMERGENCY MEDICINE

## 2022-05-02 PROCEDURE — 99283 EMERGENCY DEPT VISIT LOW MDM: CPT

## 2022-05-02 PROCEDURE — 73630 X-RAY EXAM OF FOOT: CPT

## 2022-05-02 PROCEDURE — 73502 X-RAY EXAM HIP UNI 2-3 VIEWS: CPT

## 2022-05-02 PROCEDURE — 71101 X-RAY EXAM UNILAT RIBS/CHEST: CPT

## 2022-05-02 PROCEDURE — 73610 X-RAY EXAM OF ANKLE: CPT

## 2022-05-02 RX ORDER — ACETAMINOPHEN 500 MG
1000 TABLET ORAL ONCE
Status: COMPLETED | OUTPATIENT
Start: 2022-05-02 | End: 2022-05-02

## 2022-05-02 RX ORDER — IBUPROFEN 800 MG/1
800 TABLET ORAL ONCE
Status: COMPLETED | OUTPATIENT
Start: 2022-05-02 | End: 2022-05-02

## 2022-05-02 RX ORDER — TRAMADOL HYDROCHLORIDE 50 MG/1
50 TABLET ORAL EVERY 6 HOURS PRN
Qty: 12 TABLET | Refills: 0 | Status: SHIPPED | OUTPATIENT
Start: 2022-05-02 | End: 2022-05-05

## 2022-05-02 RX ORDER — NAPROXEN 500 MG/1
500 TABLET ORAL 2 TIMES DAILY PRN
Qty: 20 TABLET | Refills: 0 | Status: SHIPPED | OUTPATIENT
Start: 2022-05-02

## 2022-05-02 RX ORDER — ACETAMINOPHEN 500 MG
1000 TABLET ORAL EVERY 8 HOURS PRN
Qty: 30 TABLET | Refills: 0 | Status: SHIPPED | OUTPATIENT
Start: 2022-05-02

## 2022-05-02 RX ADMIN — IBUPROFEN 800 MG: 800 TABLET, FILM COATED ORAL at 18:32

## 2022-05-02 RX ADMIN — ACETAMINOPHEN 1000 MG: 500 TABLET ORAL at 18:32

## 2022-05-02 ASSESSMENT — ENCOUNTER SYMPTOMS
NAUSEA: 0
VOMITING: 0
COLOR CHANGE: 1
SHORTNESS OF BREATH: 0
BACK PAIN: 0

## 2022-05-02 ASSESSMENT — PAIN - FUNCTIONAL ASSESSMENT: PAIN_FUNCTIONAL_ASSESSMENT: 0-10

## 2022-05-02 ASSESSMENT — PAIN SCALES - GENERAL: PAINLEVEL_OUTOF10: 4
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
## 2024-08-24 ENCOUNTER — HOSPITAL ENCOUNTER (EMERGENCY)
Age: 54
Discharge: HOME | End: 2024-08-24
Attending: EMERGENCY MEDICINE
Payer: MEDICAID

## 2024-08-24 VITALS
DIASTOLIC BLOOD PRESSURE: 80 MMHG | HEART RATE: 85 BPM | BODY MASS INDEX: 34.86 KG/M2 | OXYGEN SATURATION: 99 % | HEIGHT: 68 IN | RESPIRATION RATE: 18 BRPM | WEIGHT: 230 LBS | SYSTOLIC BLOOD PRESSURE: 159 MMHG | TEMPERATURE: 98.3 F

## 2024-08-24 DIAGNOSIS — L02.419 ARMPIT ABSCESS: Primary | ICD-10-CM

## 2024-08-24 PROCEDURE — 2500000003 HC RX 250 WO HCPCS: Performed by: EMERGENCY MEDICINE

## 2024-08-24 PROCEDURE — 99283 EMERGENCY DEPT VISIT LOW MDM: CPT

## 2024-08-24 PROCEDURE — 10061 I&D ABSCESS COMP/MULTIPLE: CPT

## 2024-08-24 RX ORDER — DOXYCYCLINE HYCLATE 100 MG
100 TABLET ORAL 2 TIMES DAILY
Qty: 14 TABLET | Refills: 0 | Status: SHIPPED | OUTPATIENT
Start: 2024-08-24 | End: 2024-08-31

## 2024-08-24 RX ORDER — LIDOCAINE HYDROCHLORIDE 10 MG/ML
5 INJECTION, SOLUTION INFILTRATION; PERINEURAL ONCE
Status: COMPLETED | OUTPATIENT
Start: 2024-08-24 | End: 2024-08-24

## 2024-08-24 RX ADMIN — LIDOCAINE HYDROCHLORIDE 5 ML: 10 INJECTION, SOLUTION INFILTRATION; PERINEURAL at 14:09

## 2024-08-24 ASSESSMENT — LIFESTYLE VARIABLES
HOW MANY STANDARD DRINKS CONTAINING ALCOHOL DO YOU HAVE ON A TYPICAL DAY: PATIENT DOES NOT DRINK
HOW OFTEN DO YOU HAVE A DRINK CONTAINING ALCOHOL: NEVER

## 2024-08-24 ASSESSMENT — ENCOUNTER SYMPTOMS
VOMITING: 0
NAUSEA: 0

## 2024-08-24 ASSESSMENT — PAIN - FUNCTIONAL ASSESSMENT: PAIN_FUNCTIONAL_ASSESSMENT: 0-10

## 2024-08-24 ASSESSMENT — PAIN SCALES - GENERAL: PAINLEVEL_OUTOF10: 5

## 2024-08-24 NOTE — ED PROVIDER NOTES
EMERGENCY DEPARTMENT ENCOUNTER    Pt Name: oNa Rocha  MRN: [id number]  Birthdate 1970  Date of evaluation: 8/24/24  CHIEF COMPLAINT       Chief Complaint   Patient presents with    Rash     To right axilla area- started Thursday     HISTORY OF PRESENT ILLNESS   Presenting with chief complaint of a rash on her right axilla that started 2 days ago.  Patient has a history of these but this 1 has grown in size quicker than usual and overnight prompting her visit.  She says that it is warm to the touch and there is a \"ball \"in her right armpit area.    The history is provided by the patient.           REVIEW OF SYSTEMS     Review of Systems   Constitutional:  Negative for chills and fever.   Gastrointestinal:  Negative for nausea and vomiting.   Skin:  Positive for rash.   Neurological:  Negative for light-headedness.     PASTMEDICAL HISTORY     Past Medical History:   Diagnosis Date    Asthma     Benign hypertension with CKD (chronic kidney disease) stage I 1/4/2019    CKD (chronic kidney disease) stage 1, GFR 90 ml/min or greater 1/4/2019    Goiter     History of skull fracture 4/19/2017    Microalbuminuria due to type 2 diabetes mellitus ([facility]) 1/11/2018    Morbid obesity ([facility])     Secondary diabetes mellitus with stage 1 chronic kidney disease ([facility]) 1/4/2019     Past Problem List  Patient Active Problem List   Diagnosis Code    Thyromegaly E01.0    Morbidly obese ([facility]) E66.01    Anosmia R43.0    Hyperlipidemia, mixed E78.2    Chronic pansinusitis J32.4    Essential hypertension I10    Bilateral thoracic back pain M54.6    ANNIE (obstructive sleep apnea) G47.33    Anxiety and depression F41.9, F32.A    Memory loss R41.3    Sleep disturbance G47.9    Mild intermittent asthma without complication J45.20    Seasonal allergic rhinitis J30.2    Goiter E04.9    Type 2 diabetes mellitus with microalbuminuria, without long-term current use of insulin ([facility]) E11.29, R80.9    Vitamin D deficiency E55.9    Microalbuminuria due

## 2025-08-30 ENCOUNTER — HOSPITAL ENCOUNTER (EMERGENCY)
Age: 55
Discharge: HOME | End: 2025-08-30
Attending: COUNSELOR
Payer: MEDICAID

## 2025-08-30 VITALS
DIASTOLIC BLOOD PRESSURE: 85 MMHG | HEART RATE: 77 BPM | TEMPERATURE: 97.9 F | WEIGHT: 223 LBS | BODY MASS INDEX: 33.8 KG/M2 | SYSTOLIC BLOOD PRESSURE: 145 MMHG | RESPIRATION RATE: 20 BRPM | OXYGEN SATURATION: 98 % | HEIGHT: 68 IN

## 2025-08-30 DIAGNOSIS — J34.0 NASAL ABSCESS: ICD-10-CM

## 2025-08-30 DIAGNOSIS — L02.419 AXILLARY ABSCESS: Primary | ICD-10-CM

## 2025-08-30 DIAGNOSIS — J34.89 NOSE PAIN: ICD-10-CM

## 2025-08-30 PROCEDURE — 6370000000 HC RX 637 (ALT 250 FOR IP): Performed by: COUNSELOR

## 2025-08-30 PROCEDURE — 99283 EMERGENCY DEPT VISIT LOW MDM: CPT

## 2025-08-30 RX ORDER — SULFAMETHOXAZOLE AND TRIMETHOPRIM 800; 160 MG/1; MG/1
1 TABLET ORAL 2 TIMES DAILY
Qty: 14 TABLET | Refills: 0 | Status: SHIPPED | OUTPATIENT
Start: 2025-08-30 | End: 2025-09-06

## 2025-08-30 RX ORDER — SULFAMETHOXAZOLE AND TRIMETHOPRIM 800; 160 MG/1; MG/1
1 TABLET ORAL ONCE
Status: COMPLETED | OUTPATIENT
Start: 2025-08-30 | End: 2025-08-30

## 2025-08-30 RX ORDER — SULFAMETHOXAZOLE AND TRIMETHOPRIM 800; 160 MG/1; MG/1
1 TABLET ORAL 2 TIMES DAILY
Qty: 14 TABLET | Refills: 0 | Status: SHIPPED | OUTPATIENT
Start: 2025-08-30 | End: 2025-08-30

## 2025-08-30 RX ADMIN — SULFAMETHOXAZOLE AND TRIMETHOPRIM 1 TABLET: 800; 160 TABLET ORAL at 03:10

## 2025-08-30 ASSESSMENT — ENCOUNTER SYMPTOMS
BLOOD IN STOOL: 0
VOMITING: 0
SORE THROAT: 0
ABDOMINAL PAIN: 0
COUGH: 0
DIARRHEA: 0
SHORTNESS OF BREATH: 0
NAUSEA: 0

## 2025-08-30 ASSESSMENT — PAIN SCALES - GENERAL
PAINLEVEL_OUTOF10: 6
PAINLEVEL_OUTOF10: 3

## 2025-08-30 ASSESSMENT — PAIN - FUNCTIONAL ASSESSMENT: PAIN_FUNCTIONAL_ASSESSMENT: 0-10

## 2025-08-30 ASSESSMENT — PAIN DESCRIPTION - DESCRIPTORS: DESCRIPTORS: SHOOTING;THROBBING

## 2025-08-30 ASSESSMENT — PAIN DESCRIPTION - LOCATION
LOCATION: EYE;NOSE
LOCATION: FACE;NOSE

## 2025-08-30 ASSESSMENT — PAIN DESCRIPTION - ORIENTATION
ORIENTATION: RIGHT
ORIENTATION: RIGHT